# Patient Record
Sex: FEMALE | Race: BLACK OR AFRICAN AMERICAN
[De-identification: names, ages, dates, MRNs, and addresses within clinical notes are randomized per-mention and may not be internally consistent; named-entity substitution may affect disease eponyms.]

---

## 2017-02-06 ENCOUNTER — HOSPITAL ENCOUNTER (OUTPATIENT)
Dept: HOSPITAL 62 - OD | Age: 32
End: 2017-02-06
Attending: NURSE PRACTITIONER
Payer: COMMERCIAL

## 2017-02-06 DIAGNOSIS — O10.011: Primary | ICD-10-CM

## 2017-02-06 LAB
ALT SERPL-CCNC: 26 U/L (ref 9–52)
ANION GAP SERPL CALC-SCNC: 10 MMOL/L (ref 5–19)
APTT BLD: 28.5 SEC (ref 23.5–35.8)
AST SERPL-CCNC: 18 U/L (ref 14–36)
BASOPHILS # BLD AUTO: 0 10^3/UL (ref 0–0.2)
BASOPHILS NFR BLD AUTO: 0.4 % (ref 0–2)
BUN SERPL-MCNC: 5 MG/DL (ref 7–20)
CALCIUM: 9 MG/DL (ref 8.4–10.2)
CHLORIDE SERPL-SCNC: 101 MMOL/L (ref 98–107)
CO2 SERPL-SCNC: 25 MMOL/L (ref 22–30)
CREAT SERPL-MCNC: 0.64 MG/DL (ref 0.52–1.25)
EOSINOPHIL # BLD AUTO: 0 10^3/UL (ref 0–0.6)
EOSINOPHIL NFR BLD AUTO: 0.4 % (ref 0–6)
ERYTHROCYTE [DISTWIDTH] IN BLOOD BY AUTOMATED COUNT: 14.2 % (ref 11.5–14)
GLUCOSE SERPL-MCNC: 86 MG/DL (ref 75–110)
HCT VFR BLD CALC: 37.6 % (ref 36–47)
HGB BLD-MCNC: 12.3 G/DL (ref 12–15.5)
HGB HCT DIFFERENCE: -0.7
LDH1 SERPL-CCNC: 386 U/L (ref 313–618)
LYMPHOCYTES # BLD AUTO: 2.7 10^3/UL (ref 0.5–4.7)
LYMPHOCYTES NFR BLD AUTO: 26.4 % (ref 13–45)
MCH RBC QN AUTO: 26.8 PG (ref 27–33.4)
MCHC RBC AUTO-ENTMCNC: 32.6 G/DL (ref 32–36)
MCV RBC AUTO: 82 FL (ref 80–97)
MONOCYTES # BLD AUTO: 0.5 10^3/UL (ref 0.1–1.4)
MONOCYTES NFR BLD AUTO: 5 % (ref 3–13)
NEUTROPHILS # BLD AUTO: 6.8 10^3/UL (ref 1.7–8.2)
NEUTS SEG NFR BLD AUTO: 67.8 % (ref 42–78)
POTASSIUM SERPL-SCNC: 4.1 MMOL/L (ref 3.6–5)
PROTHROMBIN TIME: 14.1 SEC (ref 11.4–15.4)
RBC # BLD AUTO: 4.57 10^6/UL (ref 3.72–5.28)
SODIUM SERPL-SCNC: 135.8 MMOL/L (ref 137–145)
URATE SERPL-MCNC: 5.1 MG/DL (ref 2.5–6.2)
WBC # BLD AUTO: 10.1 10^3/UL (ref 4–10.5)

## 2017-02-06 PROCEDURE — 85610 PROTHROMBIN TIME: CPT

## 2017-02-06 PROCEDURE — 85730 THROMBOPLASTIN TIME PARTIAL: CPT

## 2017-02-06 PROCEDURE — 36415 COLL VENOUS BLD VENIPUNCTURE: CPT

## 2017-02-06 PROCEDURE — 84156 ASSAY OF PROTEIN URINE: CPT

## 2017-02-06 PROCEDURE — 82570 ASSAY OF URINE CREATININE: CPT

## 2017-02-06 PROCEDURE — 84460 ALANINE AMINO (ALT) (SGPT): CPT

## 2017-02-06 PROCEDURE — 83615 LACTATE (LD) (LDH) ENZYME: CPT

## 2017-02-06 PROCEDURE — 80048 BASIC METABOLIC PNL TOTAL CA: CPT

## 2017-02-06 PROCEDURE — 84450 TRANSFERASE (AST) (SGOT): CPT

## 2017-02-06 PROCEDURE — 85025 COMPLETE CBC W/AUTO DIFF WBC: CPT

## 2017-02-06 PROCEDURE — 84550 ASSAY OF BLOOD/URIC ACID: CPT

## 2017-02-08 LAB — CREAT UR-MCNC: 118.8 MG/DL (ref 16–327)

## 2017-02-09 ENCOUNTER — HOSPITAL ENCOUNTER (EMERGENCY)
Dept: HOSPITAL 62 - ER | Age: 32
Discharge: HOME | End: 2017-02-09
Payer: COMMERCIAL

## 2017-02-09 VITALS — SYSTOLIC BLOOD PRESSURE: 160 MMHG | DIASTOLIC BLOOD PRESSURE: 60 MMHG

## 2017-02-09 DIAGNOSIS — Z3A.09: ICD-10-CM

## 2017-02-09 DIAGNOSIS — O21.9: Primary | ICD-10-CM

## 2017-02-09 DIAGNOSIS — O10.911: ICD-10-CM

## 2017-02-09 LAB
APPEARANCE UR: (no result)
BILIRUB UR QL STRIP: NEGATIVE
GLUCOSE UR STRIP-MCNC: NEGATIVE MG/DL
KETONES UR STRIP-MCNC: (no result) MG/DL
NITRITE UR QL STRIP: NEGATIVE
PH UR STRIP: 6 [PH] (ref 5–9)
PROT 24H UR-MRATE: 236.7 MG/24 HR (ref 30–150)
PROT UR STRIP-MCNC: 100 MG/DL
SP GR UR STRIP: 1.03
UROBILINOGEN UR-MCNC: NEGATIVE MG/DL (ref ?–2)

## 2017-02-09 PROCEDURE — 81001 URINALYSIS AUTO W/SCOPE: CPT

## 2017-02-09 PROCEDURE — 99283 EMERGENCY DEPT VISIT LOW MDM: CPT

## 2017-02-09 NOTE — ER DOCUMENT REPORT
ED General





- General


Chief Complaint: Nausea/Vomiting


Stated Complaint: VOMITING/PREGNANT


Mode of Arrival: Ambulatory


Information source: Patient


Notes: 


31-year-old female  4 para 3 who is 9 weeks pregnant presents with 

complaints of nausea vomiting.  Patient notes she took Zofran at home and 

continues to vomit for the past 2 days.  Denies any blood in her vomit, denies 

any diarrhea.


Patient has had multiple similar episodes in the past with her previous 

pregnancies


TRAVEL OUTSIDE OF THE [place] IN LAST 30 DAYS: No





- HPI


Onset: Yesterday


Onset/Duration: Persistent


Quality of pain: No pain


Severity: Mild


Pain Level: Denies


Associated symptoms: Nausea, Vomiting


Exacerbated by: Denies


Relieved by: Denies


Similar symptoms previously: Yes


Recently seen / treated by doctor: No





- Related Data


Allergies/Adverse Reactions: 


 





hydrocodone bitartrate [From Vicodin] Allergy (Mild, Verified 17 04:43)


 VOMITING











Past Medical History





- Social History


Smoking Status: Never Smoker


Cigarette use (# per day): No


Chew tobacco use (# tins/day): No


Smoking Education Provided: No


Frequency of alcohol use: None


Drug Abuse: None


Family History: DM, Hypertension


Patient has suicidal ideation: No


Patient has homicidal ideation: No





- Past Medical History


Cardiac Medical History: Reports: Hx Hypertension


   Denies: Hx Coronary Artery Disease, Hx Heart Attack


Pulmonary Medical History: 


   Denies: Hx Asthma, Hx Bronchitis, Hx COPD, Hx Pneumonia


Neurological Medical History: Denies: Hx Cerebrovascular Accident, Hx Seizures


Renal/ Medical History: Denies: Hx Peritoneal Dialysis


Musculoskeltal Medical History: Denies Hx Arthritis


Past Surgical History: Reports: Hx  Section, Hx Dilation and Curettage.

  Denies: Hx Hysterectomy





- Immunizations


Hx Diphtheria, Pertussis, Tetanus Vaccination: Yes





Review of Systems





- Review of Systems


Notes: 


REVIEW OF SYSTEMS:


CONSTITUTIONAL :  Denies fever,  chills, or sweats.  Denies recent illness.


EENT:   Denies eye, ear, throat, or mouth pain or symptoms.  Denies nasal or 

sinus congestion or discharge.  Denies throat, tongue, or mouth swelling or 

difficulty swallowing.


CARDIOVASCULAR:  Denies chest pain.  Denies palpitations or racing or irregular 

heart beat.  Denies ankle edema.


RESPIRATORY:  Denies cough, cold, or chest congestion.  Denies shortness of 

breath, difficulty breathing, or wheezing.


GASTROINTESTINAL: Admits nausea vomiting


GENITOURINARY:  Denies difficulty urinating, painful urination, burning, 

frequency, blood in urine, or discharge.


FEMALE  GENITOURINARY:  Denies vaginal bleeding, heavy or abnormal periods, 

irregular periods.  Denies vaginal discharge or odor. 


MUSCULOSKELETAL:  Denies back or neck pain or stiffness.  Denies joint pain or 

swelling.


SKIN:   Denies rash, lesions or sores.


HEMATOLOGIC :   Denies easy bruising or bleeding.


LYMPHATIC:  Denies swollen, enlarged glands.


NEUROLOGICAL:  Denies confusion or altered mental status.  Denies passing out 

or loss of consciousness.  Denies dizziness or lightheadedness.  Denies 

headache.  Denies weakness or paralysis or loss of use of either side.  Denies 

problems with gait or speech.  Denies sensory loss, numbness, or tingling.  

Denies seizures.


PSYCHIATRIC:  Denies anxiety or stress.  Denies depression, suicidal ideation, 

or homicidal ideation.





ALL OTHER SYSTEMS REVIEWED AND NEGATIVE.








Dictation was performed using Dragon voice recognition software 








PHYSICAL EXAMINATION:





GENERAL: Well-appearing, well-nourished and in no acute distress.





HEAD: Atraumatic, normocephalic.





EYES: Pupils equal round and reactive to light, extraocular movements intact, 

conjunctiva are normal.





ENT: Nares patent, oropharynx clear without exudates.  Moist mucous membranes.





NECK: Normal range of motion, supple without lymphadenopathy





LUNGS: Breath sounds clear to auscultation bilaterally and equal.  No wheezes 

rales or rhonchi.





HEART: Regular rate and rhythm without murmurs





ABDOMEN: Soft, nontender, nondistended abdomen.  No guarding, no rebound.  No 

masses appreciated.





Female : deferred





Musculoskeletal: Normal range of motion, no pitting or edema.  No cyanosis.





NEUROLOGICAL: Cranial nerves grossly intact.  Normal speech, normal gait.  

Normal sensory, motor exams





PSYCH: Normal mood, normal affect.





SKIN: Warm, Dry, normal turgor, no rashes or lesions noted.








Physical Exam





- Vital signs


Vitals: 


 











Temp Pulse Resp BP Pulse Ox


 


 98.6 F   79   18   163/62 H  100 


 


 17 04:39  17 04:39  17 04:39  17 04:39  17 04:39














Course





- Re-evaluation


Re-evalutation: 


17 06:44


Physical examination noted no signs of dehydration, patient does have some 

protein in her urine with trace ketones.  Otherwise she looks quite well.  

Patient is given Phenergan and will be reevaluated with oral hydration





17 07:17


Patient notes she has not vomited since receiving Phenergan admits significant 

relief in symptoms.  I will send her home with oral and suppository








Patient has follow-up with OB/GYN next week denies any vaginal bleeding or 

discharge or abdominal pain








After performing a Medical Screening Examination, I estimate there is LOW risk 

for ACUTE APPENDICITIS, BOWEL OBSTRUCTION, ACUTE CHOLECYSTITIS, PERFORATED 

DIVERTICULITIS, INCARCERATED HERNIA, PANCREATITIS, PELVIC INFLAMMATORY DISEASE, 

PERFORATED ULCER, ECTOPIC PREGNANCY, or TUBO-OVARIAN ABSCESS, thus I consider 

the discharge disposition reasonable. Also, there is no evidence or peritonitis

, sepsis, or toxicity. The patient and I have discussed the diagnosis and risks

, and we agree with discharging home with close follow-up with the 

understanding that symptoms and presentations can change. We also discussed 

returning to the Emergency Department immediately if new or worsening symptoms 

occur. We have discussed the symptoms which are most concerning (e.g., bloody 

stool, fever, changing or worsening pain, vomiting) that necessitate immediate 

return.





- Vital Signs


Vital signs: 


 











Temp Pulse Resp BP Pulse Ox


 


 98.6 F   79   18   163/62 H  100 


 


 17 04:41  17 04:41  17 04:41  17 04:41  17 04:41














- Laboratory


Laboratory results interpreted by me: 


 











  17





  05:17


 


Urine Protein  100 H


 


Urine Ketones  TRACE H














Discharge





- Discharge


Clinical Impression: 


 Vomiting affecting pregnancy





HTN (hypertension)


Qualifiers:


 Hypertension type: essential hypertension Qualified Code(s): I10 - Essential (

primary) hypertension





Condition: Stable


Disposition: HOME, SELF-CARE


Instructions:  Antinausea Medication ([facility])


Prescriptions: 


Promethazine HCl [Phenergan 25 mg Tablet] 1 - 2 tab PO Q6H PRN #30 tablet


 PRN Reason: 


Promethazine HCl 25 mg RC Q8 #20 supp.rect


Forms:  Elevated Blood Pressure


Referrals: 


JORDAN LAND MD [Primary Care Provider] - Follow up tomorrow

## 2017-07-28 ENCOUNTER — HOSPITAL ENCOUNTER (OUTPATIENT)
Dept: HOSPITAL 62 - LC | Age: 32
Discharge: HOME | End: 2017-07-28
Attending: OBSTETRICS & GYNECOLOGY
Payer: COMMERCIAL

## 2017-07-28 DIAGNOSIS — Z3A.34: ICD-10-CM

## 2017-07-28 DIAGNOSIS — O13.3: Primary | ICD-10-CM

## 2017-07-28 LAB
ALBUMIN SERPL-MCNC: 3.5 G/DL (ref 3.5–5)
ALP SERPL-CCNC: 104 U/L (ref 38–126)
ALT SERPL-CCNC: 19 U/L (ref 9–52)
ANION GAP SERPL CALC-SCNC: 12 MMOL/L (ref 5–19)
APPEARANCE UR: (no result)
AST SERPL-CCNC: 16 U/L (ref 14–36)
BARBITURATES UR QL SCN: NEGATIVE
BASOPHILS # BLD AUTO: 0 10^3/UL (ref 0–0.2)
BASOPHILS NFR BLD AUTO: 0.2 % (ref 0–2)
BILIRUB DIRECT SERPL-MCNC: 0.2 MG/DL (ref 0–0.4)
BILIRUB SERPL-MCNC: 0.3 MG/DL (ref 0.2–1.3)
BILIRUB UR QL STRIP: NEGATIVE
BUN SERPL-MCNC: 4 MG/DL (ref 7–20)
CALCIUM: 8.9 MG/DL (ref 8.4–10.2)
CHLORIDE SERPL-SCNC: 105 MMOL/L (ref 98–107)
CO2 SERPL-SCNC: 18 MMOL/L (ref 22–30)
CREAT SERPL-MCNC: 0.57 MG/DL (ref 0.52–1.25)
CREAT UR-MCNC: 238.3 MG/DL (ref 16–327)
EOSINOPHIL # BLD AUTO: 0 10^3/UL (ref 0–0.6)
EOSINOPHIL NFR BLD AUTO: 0.2 % (ref 0–6)
ERYTHROCYTE [DISTWIDTH] IN BLOOD BY AUTOMATED COUNT: 14.2 % (ref 11.5–14)
GLUCOSE SERPL-MCNC: 137 MG/DL (ref 75–110)
GLUCOSE UR STRIP-MCNC: NEGATIVE MG/DL
HCT VFR BLD CALC: 33.2 % (ref 36–47)
HGB BLD-MCNC: 10.9 G/DL (ref 12–15.5)
HGB HCT DIFFERENCE: -0.5
KETONES UR STRIP-MCNC: NEGATIVE MG/DL
LDH1 SERPL-CCNC: 309 U/L (ref 313–618)
LYMPHOCYTES # BLD AUTO: 1.9 10^3/UL (ref 0.5–4.7)
LYMPHOCYTES NFR BLD AUTO: 23.4 % (ref 13–45)
MCH RBC QN AUTO: 26.2 PG (ref 27–33.4)
MCHC RBC AUTO-ENTMCNC: 32.9 G/DL (ref 32–36)
MCV RBC AUTO: 80 FL (ref 80–97)
METHADONE UR QL SCN: NEGATIVE
MONOCYTES # BLD AUTO: 0.3 10^3/UL (ref 0.1–1.4)
MONOCYTES NFR BLD AUTO: 4 % (ref 3–13)
NEUTROPHILS # BLD AUTO: 5.8 10^3/UL (ref 1.7–8.2)
NEUTS SEG NFR BLD AUTO: 72.2 % (ref 42–78)
NITRITE UR QL STRIP: NEGATIVE
PCP UR QL SCN: NEGATIVE
PH UR STRIP: 6 [PH] (ref 5–9)
POTASSIUM SERPL-SCNC: 3.8 MMOL/L (ref 3.6–5)
PROT SERPL-MCNC: 7.1 G/DL (ref 6.3–8.2)
PROT UR STRIP-MCNC: 30 MG/DL
PROT UR STRIP-MCNC: 6.2 MG/DL (ref ?–12)
RBC # BLD AUTO: 4.17 10^6/UL (ref 3.72–5.28)
SODIUM SERPL-SCNC: 135.2 MMOL/L (ref 137–145)
SP GR UR STRIP: 1.03
URATE SERPL-MCNC: 5.2 MG/DL (ref 2.5–6.2)
URINE OPIATES LOW: NEGATIVE
UROBILINOGEN UR-MCNC: 2 MG/DL (ref ?–2)
WBC # BLD AUTO: 8 10^3/UL (ref 4–10.5)

## 2017-07-28 PROCEDURE — 82570 ASSAY OF URINE CREATININE: CPT

## 2017-07-28 PROCEDURE — 83615 LACTATE (LD) (LDH) ENZYME: CPT

## 2017-07-28 PROCEDURE — 80307 DRUG TEST PRSMV CHEM ANLYZR: CPT

## 2017-07-28 PROCEDURE — 59025 FETAL NON-STRESS TEST: CPT

## 2017-07-28 PROCEDURE — 85025 COMPLETE CBC W/AUTO DIFF WBC: CPT

## 2017-07-28 PROCEDURE — 84550 ASSAY OF BLOOD/URIC ACID: CPT

## 2017-07-28 PROCEDURE — 4A1HXCZ MONITORING OF PRODUCTS OF CONCEPTION, CARDIAC RATE, EXTERNAL APPROACH: ICD-10-PCS | Performed by: OBSTETRICS & GYNECOLOGY

## 2017-07-28 PROCEDURE — 36415 COLL VENOUS BLD VENIPUNCTURE: CPT

## 2017-07-28 PROCEDURE — 84156 ASSAY OF PROTEIN URINE: CPT

## 2017-07-28 PROCEDURE — 80053 COMPREHEN METABOLIC PANEL: CPT

## 2017-07-28 PROCEDURE — 81001 URINALYSIS AUTO W/SCOPE: CPT

## 2017-07-28 NOTE — NON STRESS TEST REPORT
=================================================================

Non Stress Test

=================================================================

Datetime Report Generated by CPN: 07/28/2017 18:45

   

   

=================================================================

DEMOGRAPHIC

=================================================================

   

EGA NST:  34.0

   

=================================================================

INDICATION

=================================================================

   

Indication for Study:  Ordered by Provider

   

=================================================================

MONITORING

=================================================================

   

Monitor Explained:  Monitor Explained; Test Explained; Patient

   Verbalized Understanding

Time on Monitor:  07/28/2017 18:00

Time off Monitor:  07/28/2017 18:20

NST Duration:  20

   

=================================================================

NST INTERVENTIONS

=================================================================

   

NST Interventions:  PO Hydration; Reposition Patient

Physician Notified NST:  Dr Neilsen

BABY A:  [id number]

   

=================================================================

BABY A

=================================================================

   

Fetal Movement :  Present

Contraction Frequency :  none

FHR Baseline :  140

Accelerations :  15X15

Decelerations :  None

Variability :  Moderate 6-25bpm

NST Review:  Meets Criteria for Reactive NST

NST Review and Verified By :  OTONIEL Jackson RNC

NST Results:  Reactive

   

=================================================================

NST REPORT

=================================================================

   

Report Trigger:  Send Report

## 2017-08-07 ENCOUNTER — HOSPITAL ENCOUNTER (EMERGENCY)
Dept: HOSPITAL 62 - ER | Age: 32
Discharge: HOME | End: 2017-08-07
Payer: COMMERCIAL

## 2017-08-07 VITALS — DIASTOLIC BLOOD PRESSURE: 81 MMHG | SYSTOLIC BLOOD PRESSURE: 133 MMHG

## 2017-08-07 DIAGNOSIS — M54.5: Primary | ICD-10-CM

## 2017-08-07 DIAGNOSIS — M79.604: ICD-10-CM
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
## 2017-08-07 DIAGNOSIS — M25.551: ICD-10-CM

## 2017-08-07 DIAGNOSIS — Z3A.36: ICD-10-CM

## 2017-08-07 PROCEDURE — 99283 EMERGENCY DEPT VISIT LOW MDM: CPT

## 2017-08-07 NOTE — ER DOCUMENT REPORT
ED General





- General


Chief Complaint: Fall


Stated Complaint: FALL/LEG PAIN


Time Seen by Provider: 17 01:45


Notes: 


Patient is a 32-year-old female currently 36 weeks pregnant who presents after 

having a mechanical trip and fall onto her right hip several hours prior to 

arrival.  Since that time she notes that she has had a moderate to severe 

bilateral low back pain.  The pain is described as a dull, constant, throbbing 

pain.  She has not tried anything to improve the pain.  Moving worsens the 

pain.  She denies any abdominal trauma.  No vaginal bleeding.  Active fetal 

movements continues to be felt.  She has not seen a primary doctor regarding 

today's concerns.  Denies any history of similar symptoms in the past.  Denies 

hitting her head or neck.  She denies any difficulty with ambulation.  No right 

hip pain which is the location of impact for her fall.


TRAVEL OUTSIDE OF THE [place] IN LAST 30 DAYS: No





- Related Data


Allergies/Adverse Reactions: 


 





hydrocodone bitartrate [From Vicodin] Adverse Reaction (Mild, Verified 17 

16:34)


 VOMITING











Past Medical History





- General


Information source: Patient





- Social History


Smoking Status: Never Smoker


Frequency of alcohol use: None


Drug Abuse: None


Lives with: Family


Family History: DM, Hypertension





- Past Medical History


Cardiac Medical History: Reports: Hx Hypertension


   Denies: Hx Coronary Artery Disease, Hx Heart Attack


Pulmonary Medical History: 


   Denies: Hx Asthma, Hx Bronchitis, Hx COPD, Hx Pneumonia


Neurological Medical History: Denies: Hx Cerebrovascular Accident, Hx Seizures


Renal/ Medical History: Denies: Hx Peritoneal Dialysis


Musculoskeltal Medical History: Denies Hx Arthritis


Past Surgical History: Reports: Hx  Section, Hx Dilation and Curettage.

  Denies: Hx Hysterectomy





- Immunizations


Hx Diphtheria, Pertussis, Tetanus Vaccination: Yes





Review of Systems





- Review of Systems


Notes: 


Constitutional: Negative for fever.


HENT: Negative for sore throat.


Eyes: Negative for visual changes.


Cardiovascular: Negative for chest pain.


Respiratory: Negative for shortness of breath.


Gastrointestinal: Negative for abdominal pain, vomiting or diarrhea.


Genitourinary: Negative for dysuria.


Musculoskeletal: Positive for back pain.


Skin: Negative for rash.


Neurological: Negative for headaches, weakness or numbness.





10 point ROS negative except as marked above and in HPI.





Physical Exam





- Vital signs


Vitals: 


 











Temp Pulse Resp BP Pulse Ox


 


 98.0 F   78   18   133/64 H  99 


 


 17 00:37  17 00:37  17 00:37  17 00:37  17 00:37











Interpretation: Hypertensive


Notes: 


PHYSICAL EXAMINATION:





GENERAL: Well-appearing, well-nourished and in no acute distress.





HEAD: Atraumatic, normocephalic.





EYES: Pupils equal round and reactive to light, extraocular movements intact, 

sclera anicteric, conjunctiva are normal.





ENT: nares patent, oropharynx clear without exudates.  Moist mucous membranes.





NECK: Normal range of motion, supple without lymphadenopathy





LUNGS: Breath sounds clear to auscultation bilaterally and equal.  No wheezes 

rales or rhonchi.





HEART: Regular rate and rhythm without murmurs





ABDOMEN: Gravid uterus, soft, nontender, normoactive bowel sounds.  No guarding

, no rebound.  No masses appreciated.





EXTREMITIES: Normal range of motion, no pitting or edema.  No cyanosis.





Back: No midline spinal tenderness, step-offs or deformities.  Pain on 

palpation of the bilateral blair-Lumbar spinal region





NEUROLOGICAL: 5 out of 5 strength both distally and proximally bilateral lower 

extremities.  2+ patellar reflexes bilaterally.  No clonus.  Sensation grossly 

intact in the bilateral lower extremities.  Patient is able to ambulate without 

difficulty.





PSYCH: Normal mood, normal affect.





SKIN: Warm, Dry, normal turgor, no rashes or lesions noted.





Course





- Re-evaluation


Re-evalutation: 





17 01:55


Patient presents after having a mechanical fall onto her right hip several 

hours prior to arrival.  She is 36 weeks pregnant.  Bedside ultrasound shows 

appropriate fetal movement, cardiac activity appropriate 146 bpm.  Patient has 

no difficulty with ambulation and is actually not complaining of any 

significant right hip pain.  She is rather complaining of diffuse low back 

pain. No rapid progression of symptoms, systemic symptoms including fevers, 

chills, weight loss, history of recent bacterial infection, bilateral symptoms, 

numbness, weakness, difficulty walking, urinary retention or bowel incontinence

, personal history of cancer, immunosuppression, diabetes, known AAA, or 

history of IV drug use.  Exam is without point tenderness over vertebral bodies 

and patient has symmetric and intact lower extremity strength, sensation, and 

reflexes without clonus. 2+ symmetric medial malleolar and dorsalis pedis pulses








Based on history and physical, I have a very low suspicion of a concerning 

etiology of pain including epidural compression syndrome, spinal infection, 

transverse myelitis, malignancy, abdominal aortic aneurysm, renal colic, acute 

lower extremity claudication, neurogenic claudication, ankylosing spondylitis, 

or other intra-abdominal process. Due to absence of concerning risk factors in 

history and physical as well as absence of rapidly progressive, severe, or 

bilateral symptoms, will defer imaging at this point.  Suspect likely 

musculoskeletal injury in the setting of a mechanical fall.  At this time will 

discharge with return precautions and follow-up recommendations.  Verbal 

discharge instructions given a the bedside and opportunity for questions given. 

Medication warnings reviewed. Patient is in agreement with this plan and has 

verbalized understanding of return precautions and the need for primary care 

follow-up in the next 24-72 hours.





- Vital Signs


Vital signs: 


 











Temp Pulse Resp BP Pulse Ox


 


 98.0 F   78   18   133/64 H  99 


 


 17 00:37  17 00:37  17 00:37  17 00:37  17 00:37














Discharge





- Discharge


Clinical Impression: 


Fall


Qualifiers:


 Encounter type: initial encounter Qualified Code(s): W19.XXXA - Unspecified 

fall, initial encounter





Low back pain


Qualifiers:


 Chronicity: acute Back pain laterality: bilateral Sciatica presence: without 

sciatica Qualified Code(s): M54.5 - Low back pain





Condition: Good


Disposition: HOME, SELF-CARE


Additional Instructions: 


You have been seen in the Emergency Department (ED)  today for back pain.  Your 

workup and exam have not shown any acute abnormalities and you are likely 

suffering from muscle strain or possible problems with your discs, but there is 

no treatment that will fix your symptoms at this time.  Please take Tylenol 

1000 mg every 6 hours as needed for pain.  You should also purchase a local 

lidocaine cream such as "aspercreme with lidocaine" and use per bottle 

instructions to the affected area. Apply heat to the area as often as you are 

able. Continue to keep active and avoid prolonged periods of bed rest.





Please follow up with your doctor as soon as possible regarding today's ED 

visit and your back pain.  Return to the ED for worsening back pain, fever, 

weakness or numbness of either leg, or if you develop either (1) an inability 

to urinate or have bowel movements, or (2) loss of your ability to control your 

bathroom functions (if you start having "accidents"), or if you develop other 

new symptoms that concern you.concern you.


Referrals: 


NIVIA QUIROZ MD [Primary Care Provider] - Follow up as needed

## 2017-08-21 ENCOUNTER — HOSPITAL ENCOUNTER (OUTPATIENT)
Dept: HOSPITAL 62 - LC | Age: 32
Discharge: HOME | End: 2017-08-21
Attending: OBSTETRICS & GYNECOLOGY
Payer: COMMERCIAL

## 2017-08-21 DIAGNOSIS — O46.93: ICD-10-CM

## 2017-08-21 DIAGNOSIS — Z3A.37: ICD-10-CM

## 2017-08-21 DIAGNOSIS — O36.8130: Primary | ICD-10-CM

## 2017-08-21 LAB
APPEARANCE UR: (no result)
BARBITURATES UR QL SCN: NEGATIVE
BILIRUB UR QL STRIP: NEGATIVE
GLUCOSE UR STRIP-MCNC: NEGATIVE MG/DL
KETONES UR STRIP-MCNC: NEGATIVE MG/DL
METHADONE UR QL SCN: NEGATIVE
NITRITE UR QL STRIP: NEGATIVE
PCP UR QL SCN: NEGATIVE
PH UR STRIP: 6 [PH] (ref 5–9)
PROT UR STRIP-MCNC: 30 MG/DL
SP GR UR STRIP: 1.02
URINE OPIATES LOW: NEGATIVE
UROBILINOGEN UR-MCNC: NEGATIVE MG/DL (ref ?–2)

## 2017-08-21 PROCEDURE — 81005 URINALYSIS: CPT

## 2017-08-21 PROCEDURE — 59025 FETAL NON-STRESS TEST: CPT

## 2017-08-21 PROCEDURE — 82962 GLUCOSE BLOOD TEST: CPT

## 2017-08-21 PROCEDURE — 4A1HXCZ MONITORING OF PRODUCTS OF CONCEPTION, CARDIAC RATE, EXTERNAL APPROACH: ICD-10-PCS | Performed by: OBSTETRICS & GYNECOLOGY

## 2017-08-21 PROCEDURE — 80307 DRUG TEST PRSMV CHEM ANLYZR: CPT

## 2017-08-31 LAB
APPEARANCE UR: (no result)
BARBITURATES UR QL SCN: NEGATIVE
BASOPHILS # BLD AUTO: 0 10^3/UL (ref 0–0.2)
BASOPHILS NFR BLD AUTO: 0.1 % (ref 0–2)
BILIRUB UR QL STRIP: NEGATIVE
EOSINOPHIL # BLD AUTO: 0 10^3/UL (ref 0–0.6)
EOSINOPHIL NFR BLD AUTO: 0.3 % (ref 0–6)
ERYTHROCYTE [DISTWIDTH] IN BLOOD BY AUTOMATED COUNT: 14.7 % (ref 11.5–14)
GLUCOSE UR STRIP-MCNC: >=500 MG/DL
HCT VFR BLD CALC: 34.9 % (ref 36–47)
HGB BLD-MCNC: 11.6 G/DL (ref 12–15.5)
HGB HCT DIFFERENCE: -0.1
KETONES UR STRIP-MCNC: NEGATIVE MG/DL
LYMPHOCYTES # BLD AUTO: 1.5 10^3/UL (ref 0.5–4.7)
LYMPHOCYTES NFR BLD AUTO: 19.3 % (ref 13–45)
MCH RBC QN AUTO: 26.5 PG (ref 27–33.4)
MCHC RBC AUTO-ENTMCNC: 33.2 G/DL (ref 32–36)
MCV RBC AUTO: 80 FL (ref 80–97)
METHADONE UR QL SCN: NEGATIVE
MONOCYTES # BLD AUTO: 0.4 10^3/UL (ref 0.1–1.4)
MONOCYTES NFR BLD AUTO: 4.8 % (ref 3–13)
NEUTROPHILS # BLD AUTO: 5.8 10^3/UL (ref 1.7–8.2)
NEUTS SEG NFR BLD AUTO: 75.5 % (ref 42–78)
NITRITE UR QL STRIP: NEGATIVE
PCP UR QL SCN: NEGATIVE
PH UR STRIP: 7 [PH] (ref 5–9)
PROT UR STRIP-MCNC: 30 MG/DL
RBC # BLD AUTO: 4.37 10^6/UL (ref 3.72–5.28)
SP GR UR STRIP: 1.02
URINE OPIATES LOW: NEGATIVE
UROBILINOGEN UR-MCNC: NEGATIVE MG/DL (ref ?–2)
WBC # BLD AUTO: 7.7 10^3/UL (ref 4–10.5)

## 2017-09-01 ENCOUNTER — HOSPITAL ENCOUNTER (INPATIENT)
Dept: HOSPITAL 62 - 2N | Age: 32
LOS: 2 days | Discharge: HOME | End: 2017-09-03
Attending: OBSTETRICS & GYNECOLOGY | Admitting: OBSTETRICS & GYNECOLOGY
Payer: COMMERCIAL

## 2017-09-01 DIAGNOSIS — Z79.84: ICD-10-CM

## 2017-09-01 DIAGNOSIS — O34.211: Primary | ICD-10-CM

## 2017-09-01 DIAGNOSIS — E66.8: ICD-10-CM

## 2017-09-01 DIAGNOSIS — E11.69: ICD-10-CM

## 2017-09-01 DIAGNOSIS — Z3A.39: ICD-10-CM

## 2017-09-01 PROCEDURE — 94799 UNLISTED PULMONARY SVC/PX: CPT

## 2017-09-01 PROCEDURE — 59025 FETAL NON-STRESS TEST: CPT

## 2017-09-01 PROCEDURE — 82962 GLUCOSE BLOOD TEST: CPT

## 2017-09-01 PROCEDURE — 86850 RBC ANTIBODY SCREEN: CPT

## 2017-09-01 PROCEDURE — 80307 DRUG TEST PRSMV CHEM ANLYZR: CPT

## 2017-09-01 PROCEDURE — 86901 BLOOD TYPING SEROLOGIC RH(D): CPT

## 2017-09-01 PROCEDURE — 81001 URINALYSIS AUTO W/SCOPE: CPT

## 2017-09-01 PROCEDURE — 86900 BLOOD TYPING SEROLOGIC ABO: CPT

## 2017-09-01 PROCEDURE — 36415 COLL VENOUS BLD VENIPUNCTURE: CPT

## 2017-09-01 PROCEDURE — 85025 COMPLETE CBC W/AUTO DIFF WBC: CPT

## 2017-09-01 PROCEDURE — 85027 COMPLETE CBC AUTOMATED: CPT

## 2017-09-01 RX ADMIN — OXYCODONE AND ACETAMINOPHEN PRN TAB: 5; 325 TABLET ORAL at 21:28

## 2017-09-01 RX ADMIN — DOCUSATE SODIUM SCH MG: 100 CAPSULE, LIQUID FILLED ORAL at 17:47

## 2017-09-01 RX ADMIN — IBUPROFEN SCH MG: 800 TABLET, FILM COATED ORAL at 17:46

## 2017-09-01 NOTE — NON STRESS TEST REPORT
=================================================================

Non Stress Test

=================================================================

Datetime Report Generated by CPN: 09/01/2017 08:51

   

   

=================================================================

DEMOGRAPHIC

=================================================================

   

EGA NST:  37.3

   

=================================================================

INDICATION

=================================================================

   

Indication for Study:  Decreased Fetal Movement; Ordered by Provider

   

=================================================================

MONITORING

=================================================================

   

Monitor Explained:  Monitor Explained; Test Explained; Patient

   Verbalized Understanding

Time on Monitor:  08/21/2017 13:13

Time off Monitor:  08/21/2017 13:49

NST Duration:  36

   

=================================================================

NST INTERVENTIONS

=================================================================

   

NST Interventions:  None

Physician Notified NST:  K Correia CNM

BABY A:  [id number]

   

=================================================================

BABY A

=================================================================

   

Fetal Movement :  Decreased

Contraction Frequency :  0

FHR Baseline :  150

Accelerations :  15X15

Decelerations :  None

Variability :  Moderate 6-25bpm

NST Review:  Meets Criteria for Reactive NST

NST Review and Verified By :  WILLIAMS GUTIERREZ RN

NST Results:  Reactive

   

=================================================================

NST COMMENTS

=================================================================

   

NST Comments:  PT had BPP at [facility] today 8/8

   

=================================================================

NST REPORT

=================================================================

   

Report Trigger:  Send Report

## 2017-09-02 LAB
ERYTHROCYTE [DISTWIDTH] IN BLOOD BY AUTOMATED COUNT: 14.7 % (ref 11.5–14)
HCT VFR BLD CALC: 30.1 % (ref 36–47)
HGB BLD-MCNC: 9.9 G/DL (ref 12–15.5)
HGB HCT DIFFERENCE: -0.4
MCH RBC QN AUTO: 26.6 PG (ref 27–33.4)
MCHC RBC AUTO-ENTMCNC: 32.7 G/DL (ref 32–36)
MCV RBC AUTO: 82 FL (ref 80–97)
RBC # BLD AUTO: 3.7 10^6/UL (ref 3.72–5.28)
WBC # BLD AUTO: 8.9 10^3/UL (ref 4–10.5)

## 2017-09-02 RX ADMIN — IBUPROFEN SCH MG: 800 TABLET, FILM COATED ORAL at 00:44

## 2017-09-02 RX ADMIN — IBUPROFEN SCH MG: 800 TABLET, FILM COATED ORAL at 17:08

## 2017-09-02 RX ADMIN — IBUPROFEN SCH MG: 800 TABLET, FILM COATED ORAL at 06:42

## 2017-09-02 RX ADMIN — OXYCODONE AND ACETAMINOPHEN PRN TAB: 5; 325 TABLET ORAL at 23:40

## 2017-09-02 RX ADMIN — OXYCODONE AND ACETAMINOPHEN PRN TAB: 5; 325 TABLET ORAL at 04:44

## 2017-09-02 RX ADMIN — DOCUSATE SODIUM SCH MG: 100 CAPSULE, LIQUID FILLED ORAL at 17:08

## 2017-09-02 RX ADMIN — OXYCODONE AND ACETAMINOPHEN PRN TAB: 5; 325 TABLET ORAL at 17:06

## 2017-09-02 RX ADMIN — PRENATAL W/O A VIT W/ FE FUMARATE-FA CAP 106.5-1 MG SCH CAP: 106.5 CAPSULE ORAL at 10:06

## 2017-09-02 RX ADMIN — OXYCODONE AND ACETAMINOPHEN PRN TAB: 5; 325 TABLET ORAL at 10:10

## 2017-09-02 RX ADMIN — IBUPROFEN SCH MG: 800 TABLET, FILM COATED ORAL at 12:06

## 2017-09-02 RX ADMIN — DOCUSATE SODIUM SCH MG: 100 CAPSULE, LIQUID FILLED ORAL at 10:06

## 2017-09-02 NOTE — PDOC PROGRESS REPORT
Subjective-OB


Subjective: 


Post Delivery Day: 1








32 year old.  Denies any needs at this time, lochia is stable, pain well 

controlled, voiding without difficulty, passing gas, tolerating diet. 


 








Physical Exam (OB)


Vital Signs: 


 











Temp Pulse Resp BP Pulse Ox


 


 98.0 F   93   16   136/93 H  100 


 


 17 07:54  17 07:54  17 07:54  17 07:54  17 07:54








 Intake & Output











 17





 06:59 06:59 06:59


 


Intake Total  875 


 


Output Total  1900 


 


Balance  -1025 


 


Weight 127.3 kg  














- PIH/Pre-Eclampsia


DTR's: 2 +


Clonus: Negative


Headache: Absent


Epigastric Pain: No


Visual Changes: No





- 


Dressing Removed: No


Incision: Dressing





- Lochia


Lochia Amount: Small 10-25 ml


Lochia Color: Rubra/Red





- Abdomen


Description: Soft, Round


Hernia Present: No


Fundal Description: Firm, Midline


Fundal Height: u/u - u/2





Objective-Diagnostic


Laboratory: 


 





 17 07:39 





 











  17





  07:39


 


WBC  8.9


 


RBC  3.70 L


 


Hgb  9.9 L


 


Hct  30.1 L


 


MCV  82


 


MCH  26.6 L


 


MCHC  32.7


 


RDW  14.7 H


 


Plt Count  133 L














Assessment and Plan(PN)





- Assessment and Plan


(1) Status post repeat low transverse  section


Is this a current diagnosis for this admission?: Yes   


Plan: 


routine post op care








(2) Obesity affecting pregnancy


Qualifiers: 


   Trimester: third trimester   Qualified Code(s): O99.213 - Obesity 

complicating pregnancy, third trimester   


Is this a current diagnosis for this admission?: Yes   





(3) GDM, class A1


Is this a current diagnosis for this admission?: Yes   


Plan: 


yearly follow up








(4) History of postpartum depression


Is this a current diagnosis for this admission?: Yes   


Plan: 


d/c planning, close f/u








(5) Acute blood loss anemia


Is this a current diagnosis for this admission?: Yes   


Plan: 


routine pp care


ferrous sulfate











- Time Spent with Patient


Time with patient: Less than 15 minutes


Critical Time spent with patient: Less than 15 minutes


Medications reviewed and adjusted accordingly: Yes





- Disposition


Anticipated Discharge: Home


Within: within 24 hours

## 2017-09-03 VITALS — SYSTOLIC BLOOD PRESSURE: 131 MMHG | DIASTOLIC BLOOD PRESSURE: 78 MMHG

## 2017-09-03 RX ADMIN — DOCUSATE SODIUM SCH MG: 100 CAPSULE, LIQUID FILLED ORAL at 10:21

## 2017-09-03 RX ADMIN — PRENATAL W/O A VIT W/ FE FUMARATE-FA CAP 106.5-1 MG SCH CAP: 106.5 CAPSULE ORAL at 10:21

## 2017-09-03 RX ADMIN — IBUPROFEN SCH MG: 800 TABLET, FILM COATED ORAL at 00:39

## 2017-09-03 RX ADMIN — IBUPROFEN SCH MG: 800 TABLET, FILM COATED ORAL at 05:24

## 2017-09-03 RX ADMIN — OXYCODONE AND ACETAMINOPHEN PRN TAB: 5; 325 TABLET ORAL at 08:13

## 2017-09-03 NOTE — PDOC DISCHARGE SUMMARY
Final Diagnosis


Discharge Date: 17





- Final Diagnosis


(1) Status post repeat low transverse  section


Is this a current diagnosis for this admission?: Yes   





(2) Obesity affecting pregnancy


Is this a current diagnosis for this admission?: Yes   





(3) GDM, class A1


Is this a current diagnosis for this admission?: Yes   





(4) History of postpartum depression


Is this a current diagnosis for this admission?: Yes   





(5) Acute blood loss anemia


Is this a current diagnosis for this admission?: Yes   





Discharge Data





- Discharge Medication


Home Medications: 








Metformin HCl [Glucophage] 500 mg PO BID PRN 02/10/15 


Docusate Sodium [Colace 100 mg Capsule] 100 mg PO BID #60 capsule 17 


Ferrous Sulfate [Albafort] 325 mg PO BID #60 tablet 17 


Ibuprofen [Motrin 800 mg Tablet] 800 mg PO Q6 #60 tablet 17 


Oxycodone HCl/Acetaminophen [Percocet 5-325 mg Tablet] 2 tab PO Q4HP PRN #30 

tablet 17 








Gestational Age: 39


Reason(s) for Admission: Ceasarean Section-Repeat


Prenatal Procedures: NST


Intrapartum Procedure(s): : Low Cervical, Transverse, Tubal Ligation





- [place] Data


  ** Baby 1 Female


Apgar at 1 minute: 8


Apgar at 5 minutes: 9


Weight: 3635 kg


Home with Mother: Yes


Complications: No





- Diagnosis Test


Laboratory: 


 











Temp Pulse Resp BP Pulse Ox


 


 97.9 F   92   16   120/75   98 


 


 17 07:38  17 07:38  17 07:38  17 07:38  17 07:38








 











  17





  10:16 10:20 07:39


 


RBC   4.37  3.70 L


 


Hgb   11.6 L  9.9 L


 


Hct   34.9 L  30.1 L


 


Urine Opiates Screen  NEGATIVE  














- Discharge information/Instructions


Discharge Activity: Activity As Tolerated, No Lifting Over 10 Pounds, Pelvic 

Rest, No tub bath


Discharge Diet: Regular


Disposition: HOME, SELF-CARE


Follow up with: [facility]


in: 1, Weeks

## 2017-10-15 NOTE — OPERATIVE REPORT E
Operative Report



NAME: JACQUELINE AGRCIA

MRN:  [id number]          : 1985 AGE:  32Y

DATE OF SURGERY: 2017                        ROOM: 210



PREOPERATIVE DIAGNOSES:

1.  A 39-week intrauterine pregnancy.

2.  History of  section x3, for repeat.



POSTOPERATIVE DIAGNOSES:

1.  A 39-week intrauterine pregnancy.

2.  History of  section x3, for repeat.



SURGEON:

Sloan Briceño D.O.



ASSISTANT:

None.



PROCEDURE:

Repeat low-transverse  section.



ANESTHESIA:

Spinal.



COMPLICATIONS:

None.



ESTIMATED BLOOD LOSS:

600 mL.



PATHOLOGY:

Placenta.



FINDINGS:

1.  Viable female infant at 8:14 a.m. on 2017.  Apgar 8 at 1

and 9 at 5.

2.  Normal appearing bilateral fallopian tubes and ovaries.



PROCEDURE:

Patient was taken to the operating room where her spinal anesthesia was

administered.  Once this was done, she was placed in the dorsal supine

position with a leftward tilt upon the operating room table.  She was then

prepped and draped in normal sterile fashion.  A scalpel was then used to

make a Pfannenstiel skin incision.  The skin incision was carried down

through the subcutaneous tissue to the layer of the fascia.  Fascia was

then incised in the midline.  The fascial incision was then extended

bilaterally using the Bovie cautery.  The superior fascial edge was gasped

by Kocher clamps, elevated, and the rectus muscles dissected off sharply

and bluntly.  Attention was then turned to the inferior fascial edge which

was grasped by Kocher clamps, elevated and the rectus muscles were

dissected off sharply and bluntly.  The rectus muscles were then 

in the midline, perineum identified, and entered bluntly with the

surgeon's hands.  A bladder blade was inserted.  A scalpel was then used

to make a low transverse hysterotomy incision.  The infant was found to be

in the cephalad position and delivered through this incision without

difficulty and atraumatically.  The nose and mouth were suctioned.  Cord

was clamped and cut.  The infant was handed off to the awaiting nurses.



The placenta was then manually removed from the uterus.  The uterus was

then exteriorized and cleared of all clots and debris.  The hysterotomy

incision was then reapproximated using 2 layers of 1-0 Vicryl in a running

locking fashion.  Following closure of the second layer, excellent

hemostasis was noted. The uterus was then returned to the abdomen.  Again,

hysterotomy incision was reinspected and found to have excellent

hemostasis.  The rectus muscles were then reapproximated using 1-0 Vicryl

interrupted sutures.  The fascia was then closed using 1-0 Vicryl in a

running nonlocking fashion.  The subcutaneous space was made hemostatic

using Bovie cautery.  The skin was closed with absorbable staples, covered

with an OpSite, and with a pressure dressing.  At this point in time, the

procedure was terminated.  All sponge, lap, and needle counts were correct

x2.  Patient tolerated the procedure well.  Patient was taken to the

recovery room in stable condition.







DICTATING PHYSICIAN:  Sloan Briceño DO





[id number]                  DT: 10/15/2017    0939

PHY#: [id number]            DD: 10/15/2017    0859

ID:   [id number]           JOB#: [id number]       ACCT: [id number]



cc:Sloan Briceño D.O.

>

## 2017-10-21 ENCOUNTER — HOSPITAL ENCOUNTER (EMERGENCY)
Dept: HOSPITAL 62 - ER | Age: 32
Discharge: HOME | End: 2017-10-21
Payer: COMMERCIAL

## 2017-10-21 VITALS — SYSTOLIC BLOOD PRESSURE: 136 MMHG | DIASTOLIC BLOOD PRESSURE: 98 MMHG

## 2017-10-21 DIAGNOSIS — J02.0: Primary | ICD-10-CM

## 2017-10-21 DIAGNOSIS — R50.9: ICD-10-CM

## 2017-10-21 DIAGNOSIS — R00.0: ICD-10-CM

## 2017-10-21 DIAGNOSIS — R11.2: ICD-10-CM

## 2017-10-21 PROCEDURE — 87880 STREP A ASSAY W/OPTIC: CPT

## 2017-10-21 PROCEDURE — 99284 EMERGENCY DEPT VISIT MOD MDM: CPT

## 2017-10-21 PROCEDURE — 87804 INFLUENZA ASSAY W/OPTIC: CPT

## 2017-10-21 PROCEDURE — S0119 ONDANSETRON 4 MG: HCPCS

## 2017-10-21 PROCEDURE — 96372 THER/PROPH/DIAG INJ SC/IM: CPT

## 2017-10-21 NOTE — ER DOCUMENT REPORT
ED General





- General


Chief Complaint: Nausea/Vomiting


Stated Complaint: FLU LIKE SYMPTOMS


Time Seen by Provider: 10/21/17 09:18


Mode of Arrival: Ambulatory


Information source: Patient


Notes: 





32-year-old female presents with complaints of one-week duration of not feeling 

well.  Patient notes over the past day she has had fevers has been having sore 

throat.  Patient is unsure of any sick contacts.  Patient denies any current 

vomiting but has been nauseous and vomited in the past week


TRAVEL OUTSIDE OF THE [place] IN LAST 30 DAYS: No





- HPI


Onset: Last week


Onset/Duration: Persistent


Quality of pain: Achy


Severity: Mild


Pain Level: 1


Associated symptoms: Fever, Nausea, Vomiting, Sore throat


Exacerbated by: Food


Relieved by: Denies


Similar symptoms previously: No


Recently seen / treated by doctor: No





- Related Data


Allergies/Adverse Reactions: 


 





No Known Allergies Allergy (Unverified 10/21/17 09:55)


 








Home Medications: 


 Current Home Medications





No Home Medications  10/21/17 [History]











Past Medical History





- Social History


Smoking Status: Never Smoker


Cigarette use (# per day): No


Chew tobacco use (# tins/day): No


Smoking Education Provided: No


Frequency of alcohol use: None


Drug Abuse: None


Family History: DM, Hypertension





- Past Medical History


Cardiac Medical History: Reports: Hx Hypertension - no meds


   Denies: Hx Coronary Artery Disease, Hx Heart Attack, Hx Heart Murmur


Pulmonary Medical History: 


   Denies: Hx Asthma, Hx Bronchitis, Hx COPD, Hx Pneumonia


Neurological Medical History: Denies: Hx Cerebrovascular Accident, Hx Seizures


Endocrine Medical History: Denies: Hx Hyperthyroidism, Hx Hypothyroidism


Renal/ Medical History: Reports: Hx Ovarian Cysts.  Denies: Hx Peritoneal 

Dialysis, Hx Pelvic Inflammatory Disease


Malignancy Medical History: Denies: Hx Breast Cancer, Hx Cervical Cancer, Hx 

Ovarian Cancer


Musculoskeltal Medical History: Denies Hx Arthritis


Past Surgical History: Reports: Hx  Section, Hx Dilation and Curettage.

  Denies: Hx Hysterectomy





- Immunizations


Hx Diphtheria, Pertussis, Tetanus Vaccination: Yes





Review of Systems





- Review of Systems


Notes: 





REVIEW OF SYSTEMS:


CONSTITUTIONAL : Admits to fever


EENT:   Admits to sore throat


CARDIOVASCULAR:  Denies chest pain.  Denies palpitations or racing or irregular 

heart beat.  Denies ankle edema.


RESPIRATORY:  Denies cough, cold, or chest congestion.  Denies shortness of 

breath, difficulty breathing, or wheezing.


GASTROINTESTINAL:  Denies abdominal pain or distention.  Denies nausea, vomiting

, or diarrhea.  Denies blood in vomitus, stools, or per rectum.  Denies black, 

tarry stools.  Denies constipation. 


GENITOURINARY:  Denies difficulty urinating, painful urination, burning, 

frequency, blood in urine, or discharge.


FEMALE  GENITOURINARY:  Denies vaginal bleeding, heavy or abnormal periods, 

irregular periods.  Denies vaginal discharge or odor. 


MUSCULOSKELETAL:  Denies back or neck pain or stiffness.  Denies joint pain or 

swelling.


SKIN:   Denies rash, lesions or sores.


HEMATOLOGIC :   Denies easy bruising or bleeding.


LYMPHATIC:  Denies swollen, enlarged glands.


NEUROLOGICAL:  Denies confusion or altered mental status.  Denies passing out 

or loss of consciousness.  Denies dizziness or lightheadedness.  Denies 

headache.  Denies weakness or paralysis or loss of use of either side.  Denies 

problems with gait or speech.  Denies sensory loss, numbness, or tingling.  

Denies seizures.


PSYCHIATRIC:  Denies anxiety or stress.  Denies depression, suicidal ideation, 

or homicidal ideation.





ALL OTHER SYSTEMS REVIEWED AND NEGATIVE.











PHYSICAL EXAMINATION:





GENERAL: Well-appearing, well-nourished and in no acute distress.





HEAD: Atraumatic, normocephalic.





EYES: Pupils equal round and reactive to light, extraocular movements intact, 

conjunctiva are normal.





ENT: Bilateral tonsillar erythema with exudate on the right uvula midline no 

abscess noted





NECK: Normal range of motion, supple without lymphadenopathy





LUNGS: Breath sounds clear to auscultation bilaterally and equal.  No wheezes 

rales or rhonchi.





HEART: Tachycardic


ABDOMEN: Soft, nontender, nondistended abdomen.  No guarding, no rebound.  No 

masses appreciated.





Female : deferred





Musculoskeletal: Normal range of motion, no pitting or edema.  No cyanosis.





NEUROLOGICAL: Cranial nerves grossly intact.  Normal speech, normal gait.  

Normal sensory, motor exams





PSYCH: Normal mood, normal affect.





SKIN: Warm, Dry, normal turgor, no rashes or lesions noted.

















Dictation was performed using Dragon voice recognition software





Physical Exam





- Vital signs


Vitals: 


 











Temp Pulse Resp BP Pulse Ox


 


 100.3 F   130 H  18   143/106 H  98 


 


 10/21/17 09:07  10/21/17 09:07  10/21/17 09:07  10/21/17 09:07  10/21/17 09:07














Course





- Re-evaluation


Re-evalutation: 





10/21/17 15:20


Patient is noted to be febrile tachycardic with sore throat.  Rapid strep was 

positive for apparent strep pharyngitis.  Patient requests IM injection which 

has been given to her.  She is otherwise well-appearing obviously ill from the 

strep throat but overall nontoxic











After performing a Medical Screening Examination, I estimate there is LOW risk 

for ACUTE CORONARY SYNDROME, RESPIRATORY FAILURE, SEPSIS OR MENINGITIS, thus I 

consider the discharge disposition reasonable.  I have reevaluated this patient 

multiple times and no significant life threatening changes are noted. The 

patient and I have discussed the diagnosis and risks, and we agree with 

discharging home with close follow-up. We also discussed returning to the 

Emergency Department immediately if new or worsening symptoms occur. We have 

discussed the symptoms which are most concerning (e.g., changing or worsening 

pain, trouble swallowing or breathing, neck stiffness, fever) that necessitate 

immediate return.





- Vital Signs


Vital signs: 


 











Temp Pulse Resp BP Pulse Ox


 


 100.3 F   130 H  18   143/106 H  98 


 


 10/21/17 09:07  10/21/17 09:07  10/21/17 09:07  10/21/17 09:07  10/21/17 09:07














Discharge





- Discharge


Clinical Impression: 


 Strep pharyngitis, Tachycardia





Fever


Qualifiers:


 Fever type: unspecified Qualified Code(s): R50.9 - Fever, unspecified





Condition: Stable


Disposition: HOME, SELF-CARE


Instructions:  Strep Throat ([facility])


Additional Instructions: 


Follow up with your physician tomorrow for further care or return to the ED 

IMMEDIATELY if symptoms worsen or new concerns occur. If you cannot afford to 

follow up with your primary care physician a list of low cost clinics have been 

provided at the end of your discharge papers as well.

## 2017-10-22 ENCOUNTER — HOSPITAL ENCOUNTER (INPATIENT)
Dept: HOSPITAL 62 - ER | Age: 32
LOS: 1 days | Discharge: HOME | DRG: 776 | End: 2017-10-23
Attending: FAMILY MEDICINE | Admitting: FAMILY MEDICINE
Payer: COMMERCIAL

## 2017-10-22 DIAGNOSIS — O99.89: ICD-10-CM

## 2017-10-22 DIAGNOSIS — M62.82: ICD-10-CM

## 2017-10-22 DIAGNOSIS — J02.0: ICD-10-CM

## 2017-10-22 DIAGNOSIS — E87.1: ICD-10-CM

## 2017-10-22 DIAGNOSIS — E83.42: ICD-10-CM

## 2017-10-22 DIAGNOSIS — E86.0: ICD-10-CM

## 2017-10-22 DIAGNOSIS — E66.01: ICD-10-CM

## 2017-10-22 LAB
ANION GAP SERPL CALC-SCNC: 13 MMOL/L (ref 5–19)
ANION GAP SERPL CALC-SCNC: 18 MMOL/L (ref 5–19)
ANISOCYTOSIS BLD QL SMEAR: (no result)
APPEARANCE UR: (no result)
BARBITURATES UR QL SCN: NEGATIVE
BASOPHILS NFR BLD MANUAL: 0 % (ref 0–2)
BILIRUB UR QL STRIP: (no result)
BUN SERPL-MCNC: 19 MG/DL (ref 7–20)
BUN SERPL-MCNC: 36 MG/DL (ref 7–20)
CALCIUM: 7.7 MG/DL (ref 8.4–10.2)
CALCIUM: 8.3 MG/DL (ref 8.4–10.2)
CHLORIDE SERPL-SCNC: 109 MMOL/L (ref 98–107)
CHLORIDE SERPL-SCNC: 96 MMOL/L (ref 98–107)
CO2 SERPL-SCNC: 20 MMOL/L (ref 22–30)
CO2 SERPL-SCNC: 22 MMOL/L (ref 22–30)
CREAT SERPL-MCNC: 0.9 MG/DL (ref 0.52–1.25)
CREAT SERPL-MCNC: 2.1 MG/DL (ref 0.52–1.25)
EOSINOPHIL NFR BLD MANUAL: 1 % (ref 0–6)
ERYTHROCYTE [DISTWIDTH] IN BLOOD BY AUTOMATED COUNT: 14.7 % (ref 11.5–14)
ERYTHROCYTE [DISTWIDTH] IN BLOOD BY AUTOMATED COUNT: 14.9 % (ref 11.5–14)
GLUCOSE SERPL-MCNC: 169 MG/DL (ref 75–110)
GLUCOSE SERPL-MCNC: 202 MG/DL (ref 75–110)
GLUCOSE UR STRIP-MCNC: 50 MG/DL
HCT VFR BLD CALC: 33.5 % (ref 36–47)
HCT VFR BLD CALC: 35.2 % (ref 36–47)
HGB BLD-MCNC: 10.9 G/DL (ref 12–15.5)
HGB BLD-MCNC: 11.7 G/DL (ref 12–15.5)
HGB HCT DIFFERENCE: -0.1
HGB HCT DIFFERENCE: -0.8
KETONES UR STRIP-MCNC: NEGATIVE MG/DL
MAGNESIUM SERPL-MCNC: 1.2 MG/DL (ref 1.6–2.3)
MCH RBC QN AUTO: 25.3 PG (ref 27–33.4)
MCH RBC QN AUTO: 25.6 PG (ref 27–33.4)
MCHC RBC AUTO-ENTMCNC: 32.6 G/DL (ref 32–36)
MCHC RBC AUTO-ENTMCNC: 33.3 G/DL (ref 32–36)
MCV RBC AUTO: 77 FL (ref 80–97)
MCV RBC AUTO: 78 FL (ref 80–97)
METHADONE UR QL SCN: NEGATIVE
MICROCYTES BLD QL SMEAR: SLIGHT
NEUTS BAND NFR BLD MANUAL: 10 % (ref 3–5)
NITRITE UR QL STRIP: NEGATIVE
PCP UR QL SCN: NEGATIVE
PH UR STRIP: 5 [PH] (ref 5–9)
POIKILOCYTOSIS BLD QL SMEAR: (no result)
POTASSIUM SERPL-SCNC: 3.4 MMOL/L (ref 3.6–5)
POTASSIUM SERPL-SCNC: 4.1 MMOL/L (ref 3.6–5)
PROT UR STRIP-MCNC: >=500 MG/DL
RBC # BLD AUTO: 4.32 10^6/UL (ref 3.72–5.28)
RBC # BLD AUTO: 4.57 10^6/UL (ref 3.72–5.28)
SODIUM SERPL-SCNC: 136.4 MMOL/L (ref 137–145)
SODIUM SERPL-SCNC: 142.1 MMOL/L (ref 137–145)
SP GR UR STRIP: 1.03
TARGETS BLD QL SMEAR: SLIGHT
TOTAL CELLS COUNTED BLD: 100
URINE OPIATES LOW: NEGATIVE
UROBILINOGEN UR-MCNC: 2 MG/DL (ref ?–2)
VARIANT LYMPHS NFR BLD MANUAL: 3 % (ref 13–45)
WBC # BLD AUTO: 24.4 10^3/UL (ref 4–10.5)
WBC # BLD AUTO: 26.6 10^3/UL (ref 4–10.5)
WBC TOXIC VACUOLES BLD QL SMEAR: PRESENT

## 2017-10-22 PROCEDURE — 81025 URINE PREGNANCY TEST: CPT

## 2017-10-22 PROCEDURE — 80307 DRUG TEST PRSMV CHEM ANLYZR: CPT

## 2017-10-22 PROCEDURE — 84100 ASSAY OF PHOSPHORUS: CPT

## 2017-10-22 PROCEDURE — 82962 GLUCOSE BLOOD TEST: CPT

## 2017-10-22 PROCEDURE — 96372 THER/PROPH/DIAG INJ SC/IM: CPT

## 2017-10-22 PROCEDURE — 87045 FECES CULTURE AEROBIC BACT: CPT

## 2017-10-22 PROCEDURE — 84443 ASSAY THYROID STIM HORMONE: CPT

## 2017-10-22 PROCEDURE — 70490 CT SOFT TISSUE NECK W/O DYE: CPT

## 2017-10-22 PROCEDURE — G0378 HOSPITAL OBSERVATION PER HR: HCPCS

## 2017-10-22 PROCEDURE — 85025 COMPLETE CBC W/AUTO DIFF WBC: CPT

## 2017-10-22 PROCEDURE — 83735 ASSAY OF MAGNESIUM: CPT

## 2017-10-22 PROCEDURE — 81001 URINALYSIS AUTO W/SCOPE: CPT

## 2017-10-22 PROCEDURE — 36415 COLL VENOUS BLD VENIPUNCTURE: CPT

## 2017-10-22 PROCEDURE — 99284 EMERGENCY DEPT VISIT MOD MDM: CPT

## 2017-10-22 PROCEDURE — 96365 THER/PROPH/DIAG IV INF INIT: CPT

## 2017-10-22 PROCEDURE — 82553 CREATINE MB FRACTION: CPT

## 2017-10-22 PROCEDURE — 96376 TX/PRO/DX INJ SAME DRUG ADON: CPT

## 2017-10-22 PROCEDURE — 87040 BLOOD CULTURE FOR BACTERIA: CPT

## 2017-10-22 PROCEDURE — 82550 ASSAY OF CK (CPK): CPT

## 2017-10-22 PROCEDURE — 87493 C DIFF AMPLIFIED PROBE: CPT

## 2017-10-22 PROCEDURE — 89055 LEUKOCYTE ASSESSMENT FECAL: CPT

## 2017-10-22 PROCEDURE — 87186 SC STD MICRODIL/AGAR DIL: CPT

## 2017-10-22 PROCEDURE — 85027 COMPLETE CBC AUTOMATED: CPT

## 2017-10-22 PROCEDURE — 87088 URINE BACTERIA CULTURE: CPT

## 2017-10-22 PROCEDURE — 87086 URINE CULTURE/COLONY COUNT: CPT

## 2017-10-22 PROCEDURE — 80048 BASIC METABOLIC PNL TOTAL CA: CPT

## 2017-10-22 PROCEDURE — 96375 TX/PRO/DX INJ NEW DRUG ADDON: CPT

## 2017-10-22 PROCEDURE — 96361 HYDRATE IV INFUSION ADD-ON: CPT

## 2017-10-22 PROCEDURE — 82272 OCCULT BLD FECES 1-3 TESTS: CPT

## 2017-10-22 PROCEDURE — 87205 SMEAR GRAM STAIN: CPT

## 2017-10-22 PROCEDURE — 90686 IIV4 VACC NO PRSV 0.5 ML IM: CPT

## 2017-10-22 PROCEDURE — 83036 HEMOGLOBIN GLYCOSYLATED A1C: CPT

## 2017-10-22 RX ADMIN — Medication SCH ML: at 22:24

## 2017-10-22 RX ADMIN — MAGNESIUM SULFATE IN DEXTROSE SCH ML: 10 INJECTION, SOLUTION INTRAVENOUS at 10:11

## 2017-10-22 RX ADMIN — SODIUM CHLORIDE PRN ML: 9 INJECTION, SOLUTION INTRAVENOUS at 04:11

## 2017-10-22 RX ADMIN — MAGNESIUM SULFATE IN DEXTROSE SCH ML: 10 INJECTION, SOLUTION INTRAVENOUS at 06:00

## 2017-10-22 RX ADMIN — SODIUM CHLORIDE PRN ML: 9 INJECTION, SOLUTION INTRAVENOUS at 22:22

## 2017-10-22 RX ADMIN — MAGNESIUM SULFATE IN DEXTROSE SCH ML: 10 INJECTION, SOLUTION INTRAVENOUS at 12:31

## 2017-10-22 RX ADMIN — FAMOTIDINE SCH MG: 20 TABLET, FILM COATED ORAL at 22:22

## 2017-10-22 RX ADMIN — CEFEPIME HYDROCHLORIDE SCH ML: 1 INJECTION, SOLUTION INTRAVENOUS at 22:22

## 2017-10-22 RX ADMIN — SODIUM CHLORIDE PRN ML: 9 INJECTION, SOLUTION INTRAVENOUS at 08:37

## 2017-10-22 RX ADMIN — FAMOTIDINE SCH MG: 20 TABLET, FILM COATED ORAL at 12:06

## 2017-10-22 RX ADMIN — POTASSIUM CHLORIDE SCH ML: 29.8 INJECTION, SOLUTION INTRAVENOUS at 11:48

## 2017-10-22 RX ADMIN — SODIUM CHLORIDE PRN ML: 9 INJECTION, SOLUTION INTRAVENOUS at 04:12

## 2017-10-22 RX ADMIN — MAGNESIUM SULFATE IN DEXTROSE SCH ML: 10 INJECTION, SOLUTION INTRAVENOUS at 05:57

## 2017-10-22 RX ADMIN — Medication SCH ML: at 13:45

## 2017-10-22 RX ADMIN — POTASSIUM CHLORIDE SCH ML: 29.8 INJECTION, SOLUTION INTRAVENOUS at 14:39

## 2017-10-22 RX ADMIN — SODIUM CHLORIDE PRN ML: 9 INJECTION, SOLUTION INTRAVENOUS at 13:41

## 2017-10-22 NOTE — PROGRESS NOTE
Provider Note


Provider Note: 





Patient seen and examined.  I did go ahead and start her on some IV antibiotics 

since she had a positive strep and recently postpartum 21 days.  Recheck labs 

in the a.m.  CPK BMP

## 2017-10-22 NOTE — PDOC H&P
History of Present Illness


Admission Date/PCP: 


  10/22/17 06:10





  NIVIA QUIROZ MD





History of Present Illness: 


JACQUELINE GARCIA is a 32 year old female with past medical history of gestational 

diabetes and preeclampsia who reports that starting about a week and half ago 

her throat felt funny and began to worsen over the next last several days.  She 

presented into the emergency department about 48 hours ago with complaints of 

fever accompanied by hallucinations and shaking chills.  Patient was found to 

be positive for group a strep by rapid strep and was given 1.2 million units of 

intramuscular penicillin.  Patient reports that she went home and began having 

vomiting.  She did report that she has been having diarrhea as well over the 

last several days and has been unable to hold her stool.  She denies raw or 

undercooked meat, raw or undercooked seafood, or foreign travel.  She has not 

been breast-feeding well she has been feeling poorly.  She also reports that 

she has been having significant bleeding still.  She reports that it has been 

heavier than a period at times.  Patient complains of generalized pain and 

nausea and vomiting.  Patient underwent CT of the soft tissues of the neck in 

the emergency department which is found to be negative.  She is referred to 

hospital service for acute renal failure and intractable vomiting.





Past Medical History


Cardiac Medical History: Reports: Hypertension - no meds


   Denies: Coronary Artery Disease, Myocardial Infarction, Heart Murmur


Pulmonary Medical History: 


   Denies: Asthma, Bronchitis, Chronic Obstructive Pulmonary Disease (COPD), 

Pneumonia


Neurological Medical History: 


   Denies: Seizures


Endocrine Medical History: Reports: Gestational Diabetes, Obesity


   Denies: Hyperthyroidism, Hypothyroidism


Malignancy Medical History: 


   Denies: Breast Cancer, Cervical Cancer, Ovarian Cancer


Musculoskeltal Medical History: 


   Denies: Arthritis


Hematology: Reports: Anemia





Past Surgical History


Past Surgical History: Reports:  Section


   Denies: Hysterectomy





Social History


Smoking Status: Never Smoker


Frequency of Alcohol Use: Rare


Hx Recreational Drug Use: No


Hx Prescription Drug Abuse: No





- Advance Directive


Resuscitation Status: Full Code


Surrogate healthcare decision maker:: 





AuntJULIAN





Family History


Family History: CAD, DM, Hypertension


Parental Family History Reviewed: Yes


Children Family History Reviewed: Yes


Sibling(s) Family History Reviewed.: Yes





Medication/Allergy


Home Medications: 








No Home Medications  10/21/17 








Allergies/Adverse Reactions: 


 





No Known Allergies Allergy (Unverified 10/22/17 01:37)


 











Review of Systems


Constitutional: PRESENT: anorexia, chills, fatigue, fever(s).  ABSENT: headache(

s), weight gain, weight loss


Eyes: ABSENT: visual disturbances


Ears: ABSENT: hearing changes


Nose, Mouth, and Throat: PRESENT: sore throat


Cardiovascular: ABSENT: chest pain, dyspnea on exertion, edema, orthropnea, 

palpitations


Respiratory: ABSENT: cough, dyspnea, hemoptysis, sputum


Gastrointestinal: PRESENT: diarrhea, nausea, vomiting.  ABSENT: abdominal pain, 

constipation, hematemesis, hematochezia


Genitourinary: PRESENT: dysuria.  ABSENT: hematuria


Musculoskeletal: ABSENT: joint swelling


Integumentary: ABSENT: rash, wounds


Neurological: ABSENT: abnormal gait, abnormal speech, confusion, dizziness, 

focal weakness, syncope


Psychiatric: ABSENT: anxiety, depression, homidical ideation, suicidal ideation


Endocrine: ABSENT: cold intolerance, heat intolerance, polydipsia, polyphagia, 

polyuria


Hematologic/Lymphatic: ABSENT: easy bleeding, easy bruising





Physical Exam


Vital Signs: 


 











Temp Pulse Resp BP Pulse Ox


 


 98.4 F   130 H  18   132/47 H  100 


 


 10/22/17 01:37  10/22/17 01:37  10/22/17 01:37  10/22/17 06:00  10/22/17 06:01











General appearance: PRESENT: mild distress, morbidly obese, well-developed, well

-nourished


Head exam: PRESENT: atraumatic, normocephalic


Eye exam: PRESENT: conjunctiva pink, EOMI, PERRLA.  ABSENT: scleral icterus


Ear exam: PRESENT: normal external ear exam


Mouth exam: PRESENT: dry mucosa, neck supple, tongue midline


Throat exam: PRESENT: tonsillar exudate.  ABSENT: post pharyngeal erythema, 

tonsillar erythema, tonsillogmegaly


Neck exam: PRESENT: lymphadenopathy - Shotty anterior cervical.  ABSENT: JVD, 

meningismus, tenderness, thyromegaly, tracheal deviation


Respiratory exam: PRESENT: clear to auscultation aster, symmetrical, unlabored.  

ABSENT: accessory muscle use, rales, rhonchi, tachypnea, wheezes


Cardiovascular exam: PRESENT: RRR, +S1, +S2, tachycardia.  ABSENT: diastolic 

murmur, rubs, systolic murmur


Pulses: PRESENT: normal dorsalis pedis pul


Vascular exam: PRESENT: pallor


GI/Abdominal exam: PRESENT: normal bowel sounds, soft.  ABSENT: distended, firm

, guarding, mass, Lind's sign, organolmegaly, rebound, rigid, tenderness


Rectal exam: PRESENT: deferred


Extremities exam: PRESENT: full ROM.  ABSENT: calf tenderness, clubbing, pedal 

edema


Neurological exam: PRESENT: alert, awake, oriented to person, oriented to place

, oriented to time, oriented to situation, CN II-XII grossly intact.  ABSENT: 

motor sensory deficit


Psychiatric exam: PRESENT: appropriate affect, normal mood.  ABSENT: homicidal 

ideation, suicidal ideation


Skin exam: PRESENT: dry, intact, warm.  ABSENT: cyanosis, rash





Results


Laboratory Results: 





 











  10/22/17 10/22/17 10/22/17





  04:05 04:05 05:28


 


Sodium  136.4 L  


 


Potassium  3.4 L  


 


Chloride  96 L  


 


Carbon Dioxide  22  


 


Anion Gap  18  


 


BUN  36 H  


 


Creatinine  2.10 H  


 


Est GFR ( Amer)  33 L  


 


Glucose  169 H  


 


Calcium  8.3 L  


 


Magnesium  1.2 L*  


 


Creatine Kinase   1168 H 


 


Ur Specific Gravity    1.029


 


Urine Protein    >=500 H


 


Urine Glucose (UA)    50 H


 


Urine Ketones    NEGATIVE


 


Urine Blood    LARGE H


 


Urine Bilirubin    SMALL H


 


Urine Urobilinogen    2.0 H


 


Ur Leukocyte Esterase    MODERATE H


 


Urine WBC (Auto)    13


 


Urine HCG, Qual   














  10/22/17





  05:28


 


Sodium 


 


Potassium 


 


Chloride 


 


Carbon Dioxide 


 


Anion Gap 


 


BUN 


 


Creatinine 


 


Est GFR (African Amer) 


 


Glucose 


 


Calcium 


 


Magnesium 


 


Creatine Kinase 


 


Ur Specific Gravity 


 


Urine Protein 


 


Urine Glucose (UA) 


 


Urine Ketones 


 


Urine Blood 


 


Urine Bilirubin 


 


Urine Urobilinogen 


 


Ur Leukocyte Esterase 


 


Urine WBC (Auto) 


 


Urine HCG, Qual  NEGATIVE








Impressions: 


 





Soft Tissue Neck CT  10/22/17 05:11


IMPRESSION:  Mild bilateral cervical adenopathy.  Otherwise, no acute findings 

in the soft tissues of the neck on unenhanced CT.


 














Assessment & Plan





- Diagnosis


(1) Acute renal failure


Qualifiers: 


   Acute renal failure type: unspecified   Qualified Code(s): N17.9 - Acute 

kidney failure, unspecified   


Is this a current diagnosis for this admission?: Yes   


Plan: 


Likely secondary to dehydration.  Will rehydrate.  Place patient on NS at 200 

mL an hour








(2) Hyponatremia


Is this a current diagnosis for this admission?: Yes   


Plan: 


Likely secondary to intravascular volume depletion.  Will recheck








(3) Impaired fasting glucose


Is this a current diagnosis for this admission?: Yes   


Plan: 


Check hemoglobin A1c and place on Accu-Cheks


History of gestational diabetes








(4) Rhabdomyolysis


Qualifiers: 


   Rhabdomyolysis type: non-traumatic   Qualified Code(s): M62.82 - 

Rhabdomyolysis   


Is this a current diagnosis for this admission?: Yes   


Plan: 


Possibly secondary to febrile illness.  Repeat CPK.  Patient will be hydrated 

and electro lites monitored.  Concern for arrhythmia








(5) Hypokalemia


Is this a current diagnosis for this admission?: Yes   


Plan: 


Replete and recheck








(6) Hypomagnesemia


Is this a current diagnosis for this admission?: Yes   


Plan: 


Replete and recheck.  Monitor for arrhythmia








(7) Vomiting


Qualifiers: 


   Vomiting type: unspecified   Vomiting Intractability: intractable   Nausea 

presence: with nausea   Qualified Code(s): R11.2 - Nausea with vomiting, 

unspecified   


Is this a current diagnosis for this admission?: Yes   


Plan: 


Place patient on Zofran








(8) Strep pharyngitis


Is this a current diagnosis for this admission?: Yes   


Plan: 


Patient has received definitive treatment for this








(9) Morbid obesity with BMI of 40.0-44.9, adult


Is this a current diagnosis for this admission?: Yes   





(10) Diarrhea


Qualifiers: 


   Diarrhea type: unspecified type   Qualified Code(s): R19.7 - Diarrhea, 

unspecified   


Is this a current diagnosis for this admission?: Yes   


Plan: 


We will check stool culture and C. difficile








- Time


Time Spent: 50 to 70 Minutes


Medications reviewed and adjusted accordingly: Yes


Anticipated discharge: Home


Within: within 48 hours





- Inpatient Certification


Based on my medical assessment, after consideration of the patient's 

comorbidities, presenting symptoms, or acuity I expect that the services needed 

warrant INPATIENT care.: Yes


I certify that my determination is in accordance with my understanding of 

Medicare's requirements for reasonable and necessary INPATIENT services [42 CFR 

412.3e].: Yes


Medical Necessity: Need For IV Fluids


Post Hospital Care: D/C Planner Documentation

## 2017-10-22 NOTE — ER DOCUMENT REPORT
ED General





- General


Chief Complaint: Sore Throat


Stated Complaint: SORE THROAT


Time Seen by Provider: 10/22/17 02:54


Notes: 





Patient is a 32-year-old female presents with complaints of a sore throat.  

Patient says that she has had a sore throat throat for several days now.  She 

had a fever 2 days ago.  Yesterday she was seen in the ER and diagnosed with 

strep throat.  She was given a shot of penicillin.  She says that since then 

she has had continue sore throat and also has been vomiting has had a hard time 

holding down food or liquids.  She denies any further fevers.  She says her 

fevers seem to have resolved.  She denies any difficulty breathing or 

swallowing.  No other complaints at this time.


TRAVEL OUTSIDE OF THE [place] IN LAST 30 DAYS: No





- Related Data


Allergies/Adverse Reactions: 


 





No Known Allergies Allergy (Unverified 10/22/17 01:37)


 











Past Medical History





- Social History


Smoking Status: Never Smoker


Frequency of alcohol use: None


Drug Abuse: None


Family History: DM, Hypertension





- Past Medical History


Cardiac Medical History: Reports: Hx Hypertension - no meds


   Denies: Hx Coronary Artery Disease, Hx Heart Attack, Hx Heart Murmur


Pulmonary Medical History: 


   Denies: Hx Asthma, Hx Bronchitis, Hx COPD, Hx Pneumonia


Neurological Medical History: Denies: Hx Cerebrovascular Accident, Hx Seizures


Endocrine Medical History: Denies: Hx Hyperthyroidism, Hx Hypothyroidism


Renal/ Medical History: Reports: Hx Ovarian Cysts.  Denies: Hx Peritoneal 

Dialysis, Hx Pelvic Inflammatory Disease


Malignancy Medical History: Denies: Hx Breast Cancer, Hx Cervical Cancer, Hx 

Ovarian Cancer


Musculoskeltal Medical History: Denies Hx Arthritis


Past Surgical History: Reports: Hx  Section, Hx Dilation and Curettage.

  Denies: Hx Hysterectomy





- Immunizations


Hx Diphtheria, Pertussis, Tetanus Vaccination: Yes





Review of Systems





- Review of Systems


Notes: 





My Normal Review Basic





REVIEW OF SYSTEMS:


CONSTITUTIONAL :  Denies fever,  chills, or sweats.  Denies recent illness.


EENT: Sore throat.


RESPIRATORY:  Denies cough, cold, or chest congestion.  Denies shortness of 

breath, difficulty breathing, or wheezing.


GASTROINTESTINAL:  Denies abdominal pain.  Vomiting.


MUSCULOSKELETAL:  Denies neck or back pain or joint pain or swelling.


SKIN:   Denies rash or skin lesions.


NEUROLOGICAL:  Denies altered mental status or loss of consciousness.  Denies 

headache.  Denies weakness or paralysis or loss of use of either side.  Denies 

problems with gait or speech.  Denies sensory or motor loss.


ALL OTHER SYSTEMS REVIEWED AND NEGATIVE.





Physical Exam





- Vital signs


Vitals: 


 











Temp Pulse Resp BP Pulse Ox


 


 98.4 F   130 H  18   112/64   100 


 


 10/22/17 01:37  10/22/17 01:37  10/22/17 01:37  10/22/17 01:37  10/22/17 01:37














- Notes


Notes: 





General Appearance: Well nourished, alert, cooperative, no acute distress, mild 

obvious discomfort.


Vitals: reviewed, See vital signs table.


Head: no swelling or tenderness to the head


Eyes: PERRL, EOMI, Conjuctiva clear


Mouth: No decreasd moisture


Throat: Some erythema over the tonsillar beds.  Uvula is midline.  I do not see 

any swelling or edema in the peritonsillar area.  Nothing to suggest 

peritonsillar abscess.  Patient's voice is normal.  She is able to fully open 

and close her jaw without difficulty.  She is handling secretions.


Neck: Supple, no neck tenderness, no swelling along neck.


Lungs: No wheezing, No rales, No rhonci, No accessory muscle use, good air 

exchange bilaterally.


Heart: Normal rate, Regular rythm, No murmur, no rub


Abdomen: Normal BS, soft, No rigidity, No abdominal tenderness, No guarding, no 

rebound, no abdominal masses, no organomegaly


Extremities: strength 5/5 in all extremities, good pulses in all extremities, 

no swelling or tenderness in the extremities, no edema.


Skin: warm, dry, appropriate color, no rash


Neuro: speech clear, oriented x 3, normal affect, responds appropriately to 

questions.





Course





- Re-evaluation


Re-evalutation: 





10/22/17 05:13


Patient's blood work is now back and her white count is very high at 26,000.  

Her labs also show severe dehydration.  Her creatinine is 2.1.  She is severely 

hypomagnesemic at 1.2.  I will give her IV magnesium.  We will send her 

straight to CT scan to make sure that there is not an underlying abscess I am 

not seeing on physical exam.  Patient continues to have complete control of her 

airway without any impending signs of airway compromise on exam.  Voice remains 

normal.


10/22/17 06:53








Recent CT scan was negative.  She continues you so signs of dehydration and 

rate current vomiting intractable vomiting.  Patient will be admitted for her 

intractable vomiting and dehydration and electrolyte abnormalities.  I did 

speak with Dr. Hillman who agrees to admit the patient.





Dictation of this chart was performed using voice recognition software; 

therefore, there may be some unintended grammatical errors.





- Vital Signs


Vital signs: 


 











Temp Pulse Resp BP Pulse Ox


 


 98.4 F   130 H  18   132/47 H  100 


 


 10/22/17 01:37  10/22/17 01:37  10/22/17 01:37  10/22/17 06:00  10/22/17 06:01














- Laboratory


Result Diagrams: 


 10/22/17 04:05





 10/22/17 04:05


Laboratory results interpreted by me: 


 











  10/22/17 10/22/17 10/22/17





  04:05 04:05 04:05


 


WBC  26.6 H  


 


Hgb  11.7 L  


 


Hct  35.2 L  


 


MCV  77 L  


 


MCH  25.6 L  


 


RDW  14.9 H  


 


Seg Neuts % (Manual)  83 H  


 


Band Neutrophils %  10 H  


 


Lymphocytes % (Manual)  3 L  


 


Abs Neuts (Manual)  24.7 H  


 


Sodium   136.4 L 


 


Potassium   3.4 L 


 


Chloride   96 L 


 


BUN   36 H 


 


Creatinine   2.10 H 


 


Est GFR ( Amer)   33 L 


 


Est GFR (Non-Af Amer)   27 L 


 


Glucose   169 H 


 


Calcium   8.3 L 


 


Magnesium   1.2 L* 


 


Creatine Kinase    1168 H


 


Urine Protein   


 


Urine Glucose (UA)   


 


Urine Blood   


 


Urine Bilirubin   


 


Urine Urobilinogen   


 


Ur Leukocyte Esterase   














  10/22/17





  05:28


 


WBC 


 


Hgb 


 


Hct 


 


MCV 


 


MCH 


 


RDW 


 


Seg Neuts % (Manual) 


 


Band Neutrophils % 


 


Lymphocytes % (Manual) 


 


Abs Neuts (Manual) 


 


Sodium 


 


Potassium 


 


Chloride 


 


BUN 


 


Creatinine 


 


Est GFR ( Amer) 


 


Est GFR (Non-Af Amer) 


 


Glucose 


 


Calcium 


 


Magnesium 


 


Creatine Kinase 


 


Urine Protein  >=500 H


 


Urine Glucose (UA)  50 H


 


Urine Blood  LARGE H


 


Urine Bilirubin  SMALL H


 


Urine Urobilinogen  2.0 H


 


Ur Leukocyte Esterase  MODERATE H














Discharge





- Discharge


Clinical Impression: 


 Sore throat, Renal insufficiency, Hypomagnesemia, Hypokalemia





Vomiting


Qualifiers:


 Vomiting type: unspecified Vomiting Intractability: intractable Nausea presence

: with nausea Qualified Code(s): R11.2 - Nausea with vomiting, unspecified





Condition: Stable


Disposition: ADMITTED AS OBSERVATION


Admitting Provider: Hospitalist

## 2017-10-22 NOTE — RADIOLOGY REPORT (SQ)
EXAM DESCRIPTION:  CT SOFT TISSUE NECK WITHOUT



COMPLETED DATE/TIME:  10/22/2017 5:38 am



REASON FOR STUDY:  leukocytosis, sore throat, vomiting



COMPARISON:  None.



TECHNIQUE:  Noncontrast scanning from skull base through lung apices with review of bone, soft tissue
 and lung windows.  Reconstructed coronal and sagittal MPR images reviewed.  All images stored on PAC
S.

All CT scanners at this facility use dose modulation, iterative reconstruction, and/or weight based d
osing when appropriate to reduce radiation dose to as low as reasonably achievable (ALARA).

[facility]: Dose Right  [facility]: CareDose    [facility]: Dose Right    [facility]: Teradose 4D    [facility]: Smart Technologies



RADIATION DOSE:  Up-to-date CT equipment and radiation dose reduction techniques were employed. CTDIv
ol: 17.8 mGy. DLP: 560 mGy-cm. mGy.



LIMITATIONS:  None.



FINDINGS:  SKULL BASE: Intact.

MAJOR SALIVARY GLANDS:  No inflammatory changes.

LYMPHADENOPATHY: Mildly enlarged lymph nodes are seen along the bilateral jugular chain measuring up 
to 12 mm in short axis on the right and 14 mm in short axis on the left.

MUCOSAL MASSES OR ASYMMETRY: No mucosal masses or asymmetry.

LARYNX/CORDS: No abnormal findings.

LUNG APICES: Clear.

BONES: Intact.

THYROID: Normal size.

PARANASAL SINUSES: Clear.



IMPRESSION:  Mild bilateral cervical adenopathy.  Otherwise, no acute findings in the soft tissues of
 the neck on unenhanced CT.



TECHNICAL DOCUMENTATION:  JOB ID:  [id number]

[id number]

Quality ID # [id number]: Final reports with documentation of one or more dose reduction techniques (e.g., Au
tomated exposure control, adjustment of the mA and/or kV according to patient size, use of iterative 
reconstruction technique)

 2011 [employer]- All Rights Reserved

## 2017-10-23 VITALS — DIASTOLIC BLOOD PRESSURE: 103 MMHG | SYSTOLIC BLOOD PRESSURE: 144 MMHG

## 2017-10-23 LAB
ANION GAP SERPL CALC-SCNC: 15 MMOL/L (ref 5–19)
BUN SERPL-MCNC: 16 MG/DL (ref 7–20)
CALCIUM: 7.6 MG/DL (ref 8.4–10.2)
CHLORIDE SERPL-SCNC: 110 MMOL/L (ref 98–107)
CO2 SERPL-SCNC: 20 MMOL/L (ref 22–30)
CREAT SERPL-MCNC: 0.81 MG/DL (ref 0.52–1.25)
ERYTHROCYTE [DISTWIDTH] IN BLOOD BY AUTOMATED COUNT: 14.9 % (ref 11.5–14)
GLUCOSE SERPL-MCNC: 181 MG/DL (ref 75–110)
HCT VFR BLD CALC: 33.1 % (ref 36–47)
HGB BLD-MCNC: 10.7 G/DL (ref 12–15.5)
HGB HCT DIFFERENCE: -1
MCH RBC QN AUTO: 25 PG (ref 27–33.4)
MCHC RBC AUTO-ENTMCNC: 32.2 G/DL (ref 32–36)
MCV RBC AUTO: 78 FL (ref 80–97)
POTASSIUM SERPL-SCNC: 4.2 MMOL/L (ref 3.6–5)
RBC # BLD AUTO: 4.27 10^6/UL (ref 3.72–5.28)
SODIUM SERPL-SCNC: 144.8 MMOL/L (ref 137–145)
WBC # BLD AUTO: 22.2 10^3/UL (ref 4–10.5)

## 2017-10-23 RX ADMIN — CEFEPIME HYDROCHLORIDE SCH ML: 1 INJECTION, SOLUTION INTRAVENOUS at 10:19

## 2017-10-23 RX ADMIN — SODIUM CHLORIDE PRN ML: 9 INJECTION, SOLUTION INTRAVENOUS at 05:09

## 2017-10-23 RX ADMIN — SODIUM CHLORIDE PRN ML: 9 INJECTION, SOLUTION INTRAVENOUS at 10:25

## 2017-10-23 RX ADMIN — INSULIN LISPRO PRN UNIT: 100 INJECTION, SOLUTION INTRAVENOUS; SUBCUTANEOUS at 10:17

## 2017-10-23 RX ADMIN — INSULIN LISPRO PRN UNIT: 100 INJECTION, SOLUTION INTRAVENOUS; SUBCUTANEOUS at 13:31

## 2017-10-23 RX ADMIN — Medication SCH ML: at 05:09

## 2017-10-23 RX ADMIN — FAMOTIDINE SCH MG: 20 TABLET, FILM COATED ORAL at 10:19

## 2017-10-23 NOTE — PDOC DISCHARGE SUMMARY
General





- Admit/Disc Date/PCP


Admission Date/Primary Care Provider: 


  10/22/17 09:04





  NIVIA QUIROZ MD





Discharge Date: 10/23/17





- Discharge Diagnosis


(1) Acute renal failure


Is this a current diagnosis for this admission?: Yes   





(2) Diarrhea


Is this a current diagnosis for this admission?: Yes   





(3) Hyponatremia


Is this a current diagnosis for this admission?: Yes   





(4) Morbid obesity with BMI of 40.0-44.9, adult


Is this a current diagnosis for this admission?: Yes   








- Additional Information


Resuscitation Status: Full Code


Discharge Diet: As Tolerated


Discharge Activity: Activity As Tolerated


Home Medications: 








Acetaminophen [Tylenol 325 mg Tablet] 650 mg PO Q4HP PRN  tablet 10/23/17 


Azithromycin 500 mg PO DAILY 7 Days  tablet 10/23/17 











History of Present Illness


History of Present Illness: 


JACQUELINE GARCIA is a 32 year old female








Hospital Course


Hospital Course: 





32-year-old female past medical history gestational diabetes who delivered 

approximately 21 days ago.  She had shaking chills fever recently diagnosed 

with strep throat and received IM penicillin as an outpatient.  Patient 

developed diarrhea nausea vomiting she was also found to have some acute renal 

failure secondary to her intractable vomiting and diarrhea and dehydration.  

Patient was brought into the emergency room she was given IV fluids tolerated 

well she just appeared very toxic so she was admitted overnight.  She was found 

to have rhabdo secondary to her dehydration and vomiting.  It rapidly improved.

  Patient was also found to have hyponatremia secondary to her intravascular 

volume depletion which improved after receiving IV fluids.  Since the patient 

recently given birth and she tested positive for strep right after birth felt 

that we should probably treat her since she was having some vaginal bleeding 

and discomfort for possible vaginitis or endometriaitis.  





However today after seen the patient she looks much better after receiving IV 

fluid hydration approximately 4 L she was given anti-medics and was started on 

some antibiotics which we will continue outpatient Zithromax 500 mg daily for 7 

days.





We felt that patient rapidly improved due to the unfortunate event that her 6-

year-old daughter was brought into the emergency room here with DKA and she was 

taken via helicopter to [place] to be treated.  Patient is a single mother 

and states she has no help except for her brother and her best friend who are 

both males and they really did not feel comfortable being with the patient's 

daughter.  So based on that patient states she really would like to be 

discharged home which I agree she much better today.  She was given discharge 

instructions to call or follow with primary care doctor in 1 week and her OB/

GYN on their recommendations.  Feel it patient stable to be discharged home so 

she can take care of her daughter who is apparently very critically sick.





Physical Exam


Vital Signs: 


 











Temp Pulse Resp BP Pulse Ox


 


 97.7 F   105 H  18   144/103 H  100 


 


 10/23/17 13:31  10/23/17 13:31  10/23/17 13:19  10/23/17 13:31  10/23/17 13:31








 Intake & Output











 10/22/17 10/23/17 10/24/17





 06:59 06:59 06:59


 


Intake Total  4290 


 


Balance  4290 


 


Weight  125.7 kg 











General appearance: PRESENT: no acute distress, well-developed, well-nourished


Head exam: PRESENT: atraumatic, normocephalic


Eye exam: PRESENT: conjunctiva pink, EOMI, PERRLA.  ABSENT: scleral icterus


Ear exam: PRESENT: normal external ear exam


Mouth exam: PRESENT: moist, tongue midline


Neck exam: ABSENT: carotid bruit, JVD, lymphadenopathy, thyromegaly


Respiratory exam: PRESENT: clear to auscultation aster.  ABSENT: rales, rhonchi, 

wheezes


Cardiovascular exam: PRESENT: RRR.  ABSENT: diastolic murmur, rubs, systolic 

murmur


Pulses: PRESENT: normal dorsalis pedis pul


Vascular exam: PRESENT: normal capillary refill


GI/Abdominal exam: PRESENT: normal bowel sounds, soft.  ABSENT: distended, 

guarding, mass, organolmegaly, rebound, tenderness


Rectal exam: PRESENT: deferred


Extremities exam: PRESENT: full ROM.  ABSENT: calf tenderness, clubbing, pedal 

edema


Neurological exam: PRESENT: alert, awake, oriented to person, oriented to place

, oriented to time, oriented to situation, CN II-XII grossly intact.  ABSENT: 

motor sensory deficit


Psychiatric exam: PRESENT: appropriate affect, normal mood.  ABSENT: homicidal 

ideation, suicidal ideation


Skin exam: PRESENT: dry, intact, warm.  ABSENT: cyanosis, rash





Results


Laboratory Results: 


 





 10/23/17 06:59 





 10/23/17 08:27 





 











  10/22/17 10/22/17 10/22/17





  17:15 22:03 22:32


 


WBC  24.4 H  


 


RBC  4.32  


 


Hgb  10.9 L  


 


Hct  33.5 L  


 


MCV  78 L  


 


MCH  25.3 L  


 


MCHC  32.6  


 


RDW  14.7 H  


 


Plt Count  161  


 


Sodium   142.1 


 


Potassium   4.1 


 


Chloride   109 H 


 


Carbon Dioxide   20 L 


 


Anion Gap   13 


 


BUN   19 


 


Creatinine   0.90 


 


Est GFR ( Amer)   > 60 


 


Est GFR (Non-Af Amer)   > 60 


 


Glucose   202 H 


 


Calcium   7.7 L 


 


Stool Occult Blood    NEGATIVE


 


Stool for White Cells   














  10/22/17 10/23/17 10/23/17





  22:32 06:59 06:59


 


WBC   22.2 H 


 


RBC   4.27 


 


Hgb   10.7 L 


 


Hct   33.1 L 


 


MCV   78 L 


 


MCH   25.0 L 


 


MCHC   32.2 


 


RDW   14.9 H 


 


Plt Count   183 


 


Sodium    Cancelled


 


Potassium    Cancelled


 


Chloride    Cancelled


 


Carbon Dioxide    Cancelled


 


Anion Gap    Cancelled


 


BUN    Cancelled


 


Creatinine    Cancelled


 


Est GFR ( Amer)    Cancelled


 


Est GFR (Non-Af Amer)    Cancelled


 


Glucose    Cancelled


 


Calcium    Cancelled


 


Stool Occult Blood   


 


Stool for White Cells  NO WBCs SEEN  














  10/23/17





  08:27


 


WBC 


 


RBC 


 


Hgb 


 


Hct 


 


MCV 


 


MCH 


 


MCHC 


 


RDW 


 


Plt Count 


 


Sodium  144.8


 


Potassium  4.2


 


Chloride  110 H


 


Carbon Dioxide  20 L


 


Anion Gap  15


 


BUN  16


 


Creatinine  0.81


 


Est GFR ( Amer)  > 60


 


Est GFR (Non-Af Amer)  > 60


 


Glucose  181 H


 


Calcium  7.6 L


 


Stool Occult Blood 


 


Stool for White Cells 








 











  10/23/17 10/23/17





  06:59 08:27


 


CK-MB (CK-2)  Cancelled  1.14











Impressions: 


 





Soft Tissue Neck CT  10/22/17 05:11


IMPRESSION:  Mild bilateral cervical adenopathy.  Otherwise, no acute findings 

in the soft tissues of the neck on unenhanced CT.


 














Plan


Time Spent: Less than 30 Minutes

## 2017-11-01 ENCOUNTER — HOSPITAL ENCOUNTER (INPATIENT)
Dept: HOSPITAL 62 - ER | Age: 32
LOS: 4 days | Discharge: HOME | DRG: 418 | End: 2017-11-05
Attending: SURGERY | Admitting: SURGERY
Payer: COMMERCIAL

## 2017-11-01 DIAGNOSIS — E66.9: ICD-10-CM

## 2017-11-01 DIAGNOSIS — D50.9: ICD-10-CM

## 2017-11-01 DIAGNOSIS — I10: ICD-10-CM

## 2017-11-01 DIAGNOSIS — Z83.3: ICD-10-CM

## 2017-11-01 DIAGNOSIS — I16.0: ICD-10-CM

## 2017-11-01 DIAGNOSIS — Z82.49: ICD-10-CM

## 2017-11-01 DIAGNOSIS — K85.10: Primary | ICD-10-CM

## 2017-11-01 LAB
ABSOLUTE EOSINOPHILS# (MANUAL): 0 10^3/UL (ref 0–0.6)
ALBUMIN SERPL-MCNC: 4.1 G/DL (ref 3.5–5)
ALP SERPL-CCNC: 64 U/L (ref 38–126)
ALT SERPL-CCNC: 74 U/L (ref 9–52)
ANION GAP SERPL CALC-SCNC: 14 MMOL/L (ref 5–19)
ANISOCYTOSIS BLD QL SMEAR: SLIGHT
APPEARANCE UR: (no result)
AST SERPL-CCNC: 124 U/L (ref 14–36)
BASOPHILS NFR BLD MANUAL: 0 % (ref 0–2)
BILIRUB DIRECT SERPL-MCNC: 0.4 MG/DL (ref 0–0.4)
BILIRUB SERPL-MCNC: 0.4 MG/DL (ref 0.2–1.3)
BILIRUB UR QL STRIP: NEGATIVE
BUN SERPL-MCNC: 12 MG/DL (ref 7–20)
CALCIUM: 9.6 MG/DL (ref 8.4–10.2)
CHLORIDE SERPL-SCNC: 103 MMOL/L (ref 98–107)
CO2 SERPL-SCNC: 25 MMOL/L (ref 22–30)
CREAT SERPL-MCNC: 0.82 MG/DL (ref 0.52–1.25)
EOSINOPHIL NFR BLD MANUAL: 0 % (ref 0–6)
ERYTHROCYTE [DISTWIDTH] IN BLOOD BY AUTOMATED COUNT: 15.3 % (ref 11.5–14)
GLUCOSE SERPL-MCNC: 129 MG/DL (ref 75–110)
GLUCOSE UR STRIP-MCNC: NEGATIVE MG/DL
HCT VFR BLD CALC: 34.8 % (ref 36–47)
HGB BLD-MCNC: 11.4 G/DL (ref 12–15.5)
HGB HCT DIFFERENCE: -0.6
KETONES UR STRIP-MCNC: NEGATIVE MG/DL
LIPASE SERPL-CCNC: 904.8 U/L (ref 23–300)
MCH RBC QN AUTO: 25.8 PG (ref 27–33.4)
MCHC RBC AUTO-ENTMCNC: 32.9 G/DL (ref 32–36)
MCV RBC AUTO: 78 FL (ref 80–97)
MICROCYTES BLD QL SMEAR: SLIGHT
NITRITE UR QL STRIP: NEGATIVE
PH UR STRIP: 5 [PH] (ref 5–9)
POTASSIUM SERPL-SCNC: 4.7 MMOL/L (ref 3.6–5)
PROT SERPL-MCNC: 8.9 G/DL (ref 6.3–8.2)
PROT UR STRIP-MCNC: 100 MG/DL
RBC # BLD AUTO: 4.44 10^6/UL (ref 3.72–5.28)
SODIUM SERPL-SCNC: 141.6 MMOL/L (ref 137–145)
SP GR UR STRIP: 1.02
TOTAL CELLS COUNTED BLD: 100
TOXIC GRANULES BLD QL SMEAR: (no result)
UROBILINOGEN UR-MCNC: NEGATIVE MG/DL (ref ?–2)
VARIANT LYMPHS NFR BLD MANUAL: 34 % (ref 13–45)
WBC # BLD AUTO: 8 10^3/UL (ref 4–10.5)
WBC TOXIC VACUOLES BLD QL SMEAR: PRESENT

## 2017-11-01 PROCEDURE — 80048 BASIC METABOLIC PNL TOTAL CA: CPT

## 2017-11-01 PROCEDURE — 81001 URINALYSIS AUTO W/SCOPE: CPT

## 2017-11-01 PROCEDURE — 76705 ECHO EXAM OF ABDOMEN: CPT

## 2017-11-01 PROCEDURE — 99285 EMERGENCY DEPT VISIT HI MDM: CPT

## 2017-11-01 PROCEDURE — 80076 HEPATIC FUNCTION PANEL: CPT

## 2017-11-01 PROCEDURE — 84156 ASSAY OF PROTEIN URINE: CPT

## 2017-11-01 PROCEDURE — 85025 COMPLETE CBC W/AUTO DIFF WBC: CPT

## 2017-11-01 PROCEDURE — 71020: CPT

## 2017-11-01 PROCEDURE — 80053 COMPREHEN METABOLIC PANEL: CPT

## 2017-11-01 PROCEDURE — 94799 UNLISTED PULMONARY SVC/PX: CPT

## 2017-11-01 PROCEDURE — 88304 TISSUE EXAM BY PATHOLOGIST: CPT

## 2017-11-01 PROCEDURE — 82962 GLUCOSE BLOOD TEST: CPT

## 2017-11-01 PROCEDURE — 36415 COLL VENOUS BLD VENIPUNCTURE: CPT

## 2017-11-01 PROCEDURE — S0028 INJECTION, FAMOTIDINE, 20 MG: HCPCS

## 2017-11-01 PROCEDURE — 82570 ASSAY OF URINE CREATININE: CPT

## 2017-11-01 PROCEDURE — 83690 ASSAY OF LIPASE: CPT

## 2017-11-01 PROCEDURE — 74300 X-RAY BILE DUCTS/PANCREAS: CPT

## 2017-11-01 PROCEDURE — 84703 CHORIONIC GONADOTROPIN ASSAY: CPT

## 2017-11-01 NOTE — ER DOCUMENT REPORT
ED General





- General


Chief Complaint: Low Back Pain


Stated Complaint: LOWER BACK PAIN


Time Seen by Provider: 17 22:51


Mode of Arrival: Ambulatory


Information source: Patient


TRAVEL OUTSIDE OF THE [place] IN LAST 30 DAYS: No





- HPI


Patient complains to provider of: Right flank


Onset: Other - 2-3 days


Onset/Duration: Gradual


Quality of pain: Sharp


Severity: Moderate


Pain Level: 4


Exacerbated by: Movement, Coughing, Deep breathing


Relieved by: Denies


Notes: 





Patient is a 32-year-old female resenting to the emergency room complaining of 

right flank pain for the past few days, she was recently seen at this hospital 

and admitted for strep throat with acute renal failure from the hydration, 

going home she has been having pain in the right flank, she denies any nausea, 

vomiting or diarrhea, it does hurt when she moves a certain way or takes a deep 

breath, she denies any fever, 2 days ago she had some epigastric pain as well, 

patient is 2 weeks postpartum from a scheduled  delivery





- Related Data


Allergies/Adverse Reactions: 


 





No Known Allergies Allergy (Unverified 10/22/17 01:37)


 











Past Medical History





- General


Information source: Patient





- Social History


Smoking Status: Never Smoker


Chew tobacco use (# tins/day): No


Frequency of alcohol use: None


Drug Abuse: None


Family History: CAD, DM, Hypertension


Patient has suicidal ideation: No


Patient has homicidal ideation: No





- Past Medical History


Cardiac Medical History: Reports: Hx Hypertension - no meds


   Denies: Hx Coronary Artery Disease, Hx Heart Attack, Hx Heart Murmur


Pulmonary Medical History: 


   Denies: Hx Asthma, Hx Bronchitis, Hx COPD, Hx Pneumonia


Neurological Medical History: Denies: Hx Cerebrovascular Accident, Hx Seizures


Endocrine Medical History: Denies: Hx Hyperthyroidism, Hx Hypothyroidism


Renal/ Medical History: Reports: Hx Ovarian Cysts.  Denies: Hx Peritoneal 

Dialysis, Hx Pelvic Inflammatory Disease


Malignancy Medical History: Denies: Hx Breast Cancer, Hx Cervical Cancer, Hx 

Ovarian Cancer


Musculoskeltal Medical History: Denies Hx Arthritis


Past Surgical History: Reports: Hx  Section, Hx Dilation and Curettage.

  Denies: Hx Hysterectomy





- Immunizations


Hx Diphtheria, Pertussis, Tetanus Vaccination: Yes





Review of Systems





- Review of Systems


Constitutional: No symptoms reported


EENT: No symptoms reported


Cardiovascular: No symptoms reported


Respiratory: No symptoms reported


Gastrointestinal: See HPI


Genitourinary: No symptoms reported


Female Genitourinary: No symptoms reported


Musculoskeletal: No symptoms reported


Skin: No symptoms reported


Hematologic/Lymphatic: No symptoms reported


Neurological/Psychological: No symptoms reported


-: Yes All other systems reviewed and negative





Physical Exam





- Vital signs


Vitals: 


 











Temp Pulse Resp BP Pulse Ox


 


 99.5 F   84   17   135/85 H  100 


 


 17 20:40  17 20:40  17 20:40  17 20:40  17 20:40











Interpretation: Normal





- General


General appearance: Appears well, Alert





- HEENT


Head: Normocephalic, Atraumatic


Eyes: Normal


Pupils: PERRL





- Respiratory


Respiratory status: No respiratory distress


Chest status: Nontender


Breath sounds: Normal


Chest palpation: Normal





- Cardiovascular


Rhythm: Regular


Heart sounds: Normal auscultation


Murmur: No





- Abdominal


Inspection: Normal


Distension: No distension


Bowel sounds: Normal


Tenderness: Lind's sign


Organomegaly: No organomegaly





- Back


Back: Normal, Nontender





- Extremities


General upper extremity: Normal inspection, Nontender, Normal color, Normal ROM

, Normal temperature


General lower extremity: Normal inspection, Nontender, Normal color, Normal ROM

, Normal temperature, Normal weight bearing.  No: Germania's sign





- Neurological


Neuro grossly intact: Yes


Cognition: Normal


Orientation: AAOx4


Baldomero Coma Scale Eye Opening: Spontaneous


Arma Coma Scale Verbal: Oriented


Baldomero Coma Scale Motor: Obeys Commands


Baldomero Coma Scale Total: 15


Speech: Normal


Motor strength normal: LUE, RUE, LLE, RLE


Sensory: Normal





- Psychological


Associated symptoms: Normal affect, Normal mood





- Skin


Skin Temperature: Warm


Skin Moisture: Dry


Skin Color: Normal





Course





- Re-evaluation


Re-evalutation: 





17 01:03


Patient was discussed with the surgeon, Dr. Cash who agrees to admit for 

further evaluation and treatment, requests that patient be kept n.p.o. for 

likely surgery this plan was discussed with patient who is in agreement





- Vital Signs


Vital signs: 


 











Temp Pulse Resp BP Pulse Ox


 


 97.8 F   84   17   140/78 H  96 


 


 17 01:01  17 20:40  17 20:40  17 02:01  17 02:01














- Laboratory


Result Diagrams: 


 17 22:30





 17 22:30


Laboratory results interpreted by me: 


 











  17





  21:50 22:30 22:30


 


Hgb    11.4 L


 


Hct    34.8 L


 


MCV    78 L


 


MCH    25.8 L


 


RDW    15.3 H


 


Glucose   129 H 


 


AST   124 H 


 


ALT   74 H 


 


Total Protein   8.9 H 


 


Lipase   904.8 H 


 


Urine Protein  100 H  


 


Urine Blood  SMALL H  


 


Urine Ascorbic Acid  20 H  














- Diagnostic Test


Radiology reviewed: Image reviewed, Reports reviewed





Discharge





- Discharge


Clinical Impression: 


 Gallstone pancreatitis





Condition: Stable


Disposition: ADMITTED AS INPATIENT


Admitting Provider: Surgicalist


Unit Admitted: Surgical Floor

## 2017-11-02 LAB
ALBUMIN SERPL-MCNC: 3.4 G/DL (ref 3.5–5)
ALP SERPL-CCNC: 53 U/L (ref 38–126)
ALT SERPL-CCNC: 66 U/L (ref 9–52)
ANION GAP SERPL CALC-SCNC: 13 MMOL/L (ref 5–19)
AST SERPL-CCNC: 89 U/L (ref 14–36)
BASOPHILS # BLD AUTO: 0 10^3/UL (ref 0–0.2)
BASOPHILS NFR BLD AUTO: 0.6 % (ref 0–2)
BILIRUB DIRECT SERPL-MCNC: 0.3 MG/DL (ref 0–0.4)
BILIRUB SERPL-MCNC: 0.3 MG/DL (ref 0.2–1.3)
BUN SERPL-MCNC: 11 MG/DL (ref 7–20)
CALCIUM: 8.4 MG/DL (ref 8.4–10.2)
CHLORIDE SERPL-SCNC: 108 MMOL/L (ref 98–107)
CO2 SERPL-SCNC: 21 MMOL/L (ref 22–30)
CREAT SERPL-MCNC: 0.65 MG/DL (ref 0.52–1.25)
EOSINOPHIL # BLD AUTO: 0 10^3/UL (ref 0–0.6)
EOSINOPHIL NFR BLD AUTO: 0.2 % (ref 0–6)
ERYTHROCYTE [DISTWIDTH] IN BLOOD BY AUTOMATED COUNT: 15.3 % (ref 11.5–14)
GLUCOSE SERPL-MCNC: 140 MG/DL (ref 75–110)
HCT VFR BLD CALC: 31.2 % (ref 36–47)
HGB BLD-MCNC: 10.1 G/DL (ref 12–15.5)
HGB HCT DIFFERENCE: -0.9
LIPASE SERPL-CCNC: 472.5 U/L (ref 23–300)
LYMPHOCYTES # BLD AUTO: 1.6 10^3/UL (ref 0.5–4.7)
LYMPHOCYTES NFR BLD AUTO: 34.8 % (ref 13–45)
MCH RBC QN AUTO: 25.3 PG (ref 27–33.4)
MCHC RBC AUTO-ENTMCNC: 32.3 G/DL (ref 32–36)
MCV RBC AUTO: 78 FL (ref 80–97)
MONOCYTES # BLD AUTO: 0.3 10^3/UL (ref 0.1–1.4)
MONOCYTES NFR BLD AUTO: 6.2 % (ref 3–13)
NEUTROPHILS # BLD AUTO: 2.7 10^3/UL (ref 1.7–8.2)
NEUTS SEG NFR BLD AUTO: 58.2 % (ref 42–78)
POTASSIUM SERPL-SCNC: 3.6 MMOL/L (ref 3.6–5)
PROT SERPL-MCNC: 7.6 G/DL (ref 6.3–8.2)
RBC # BLD AUTO: 3.98 10^6/UL (ref 3.72–5.28)
SODIUM SERPL-SCNC: 141.5 MMOL/L (ref 137–145)
WBC # BLD AUTO: 4.7 10^3/UL (ref 4–10.5)

## 2017-11-02 RX ADMIN — HYDROMORPHONE HYDROCHLORIDE PRN MG: 2 INJECTION INTRAMUSCULAR; INTRAVENOUS; SUBCUTANEOUS at 19:52

## 2017-11-02 RX ADMIN — DEXAMETHASONE SODIUM PHOSPHATE PRN MG: 10 INJECTION INTRAMUSCULAR; INTRAVENOUS at 19:53

## 2017-11-02 RX ADMIN — DEXAMETHASONE SODIUM PHOSPHATE PRN MG: 10 INJECTION INTRAMUSCULAR; INTRAVENOUS at 09:02

## 2017-11-02 RX ADMIN — HYDROMORPHONE HYDROCHLORIDE PRN MG: 2 INJECTION INTRAMUSCULAR; INTRAVENOUS; SUBCUTANEOUS at 09:02

## 2017-11-02 RX ADMIN — SODIUM CHLORIDE PRN ML: 9 INJECTION, SOLUTION INTRAVENOUS at 08:54

## 2017-11-02 RX ADMIN — HYDROMORPHONE HYDROCHLORIDE PRN MG: 2 INJECTION INTRAMUSCULAR; INTRAVENOUS; SUBCUTANEOUS at 17:03

## 2017-11-02 RX ADMIN — SODIUM CHLORIDE PRN ML: 9 INJECTION, SOLUTION INTRAVENOUS at 17:04

## 2017-11-02 RX ADMIN — HYDROMORPHONE HYDROCHLORIDE PRN MG: 2 INJECTION INTRAMUSCULAR; INTRAVENOUS; SUBCUTANEOUS at 02:57

## 2017-11-02 RX ADMIN — SODIUM CHLORIDE PRN ML: 9 INJECTION, SOLUTION INTRAVENOUS at 02:57

## 2017-11-02 NOTE — RADIOLOGY REPORT (SQ)
EXAM DESCRIPTION:  U/S ABDOMEN LIMITED W/O DOP



COMPLETED DATE/TIME:  11/2/2017 12:16 am



REASON FOR STUDY:  abd pain



COMPARISON:  None.



TECHNIQUE:  Dynamic and static grayscale images acquired of the abdomen and recorded on PACS. Additio
nal selected color Doppler and spectral images recorded.



LIMITATIONS:  None.



FINDINGS:  PANCREAS: No masses.  Visualized pancreatic duct normal caliber.

LIVER: 21.5 cm.  Fatty infiltration.  No focal masses.

LIVER VASCULATURE: Normal directional flow of the main portal vein and hepatic veins.

GALLBLADDER: Neck in multiple stones.  No wall thickening.

ULTRASOUND-DETECTED MONTALVO'S SIGN: Negative.

INTRAHEPATIC DUCTS AND COMMON DUCT: CBD and intrahepatic ducts normal caliber. No filling defects.

INFERIOR VENA CAVA: Normal flow.

AORTA: No aneurysm.

RIGHT KIDNEY:  Normal size. Normal echogenicity. No solid or suspicious masses. No hydronephrosis. No
 calcifications.

PERITONEAL AND RIGHT PLEURAL SPACE: No ascites or effusions.

OTHER: No other significant findings.



IMPRESSION:  Hepatic steatosis.  Hepatomegaly.

Gallstones.



TECHNICAL DOCUMENTATION:  JOB ID:  [id number]

 [employer]- All Rights Reserved

## 2017-11-02 NOTE — PDOC PROGRESS REPORT
Subjective


Progress Note for:: 11/02/17


Subjective:: 





Abdominal pain improved but still present.  3 out of 5 in severity.





Physical Exam


Vital Signs: 


 











Temp Pulse Resp BP Pulse Ox


 


 98.5 F   84   16   141/86 H  100 


 


 11/02/17 06:01  11/02/17 06:01  11/02/17 06:01  11/02/17 06:01  11/02/17 06:01








 Intake & Output











 11/01/17 11/02/17 11/03/17





 06:59 06:59 06:59


 


Intake Total  600 


 


Output Total  200 


 


Balance  400 


 


Weight  116.7 kg 











General appearance: PRESENT: no acute distress, cooperative


Respiratory exam: PRESENT: clear to auscultation aster


Cardiovascular exam: PRESENT: RRR


GI/Abdominal exam: PRESENT: other - Soft, mildly distended, tenderness across 

the epigastric region without peritoneal signs.


Extremities exam: PRESENT: other - No swelling





Results


Laboratory Results: 


 





 11/02/17 05:12 





 11/02/17 05:12 





 











  11/02/17 11/02/17





  05:12 05:12


 


WBC  4.7 


 


RBC  3.98 


 


Hgb  10.1 L 


 


Hct  31.2 L 


 


MCV  78 L 


 


MCH  25.3 L 


 


MCHC  32.3 


 


RDW  15.3 H 


 


Plt Count  281 


 


Seg Neutrophils %  58.2 


 


Lymphocytes %  34.8 


 


Monocytes %  6.2 


 


Eosinophils %  0.2 


 


Basophils %  0.6 


 


Absolute Neutrophils  2.7 


 


Absolute Lymphocytes  1.6 


 


Absolute Monocytes  0.3 


 


Absolute Eosinophils  0.0 


 


Absolute Basophils  0.0 


 


Sodium   141.5


 


Potassium   3.6  D


 


Chloride   108 H


 


Carbon Dioxide   21 L


 


Anion Gap   13


 


BUN   11


 


Creatinine   0.65


 


Est GFR ( Amer)   > 60


 


Est GFR (Non-Af Amer)   > 60


 


Glucose   140 H


 


Calcium   8.4


 


Total Bilirubin   0.3


 


AST   89 H


 


ALT   66 H


 


Alkaline Phosphatase   53


 


Total Protein   7.6


 


Albumin   3.4 L


 


Lipase   472.5 H











Impressions: 


 





Abdomen Ultrasound  11/01/17 23:04


IMPRESSION:  Hepatic steatosis.  Hepatomegaly.


Gallstones.


 














Assessment & Plan





- Diagnosis


(1) Gallstone pancreatitis


Is this a current diagnosis for this admission?: Yes   


Plan: 


Still with evidence of pancreatitis with abdominal pain and elevated lipase.  

Will await pancreatitis to settle down for a safe laparoscopic cholecystectomy.

  Encourage ambulation

## 2017-11-03 LAB
ALBUMIN SERPL-MCNC: 3.2 G/DL (ref 3.5–5)
ALP SERPL-CCNC: 53 U/L (ref 38–126)
ALT SERPL-CCNC: 60 U/L (ref 9–52)
ANION GAP SERPL CALC-SCNC: 11 MMOL/L (ref 5–19)
AST SERPL-CCNC: 62 U/L (ref 14–36)
BASOPHILS # BLD AUTO: 0 10^3/UL (ref 0–0.2)
BASOPHILS NFR BLD AUTO: 1.1 % (ref 0–2)
BILIRUB DIRECT SERPL-MCNC: 0.3 MG/DL (ref 0–0.4)
BILIRUB SERPL-MCNC: 0.4 MG/DL (ref 0.2–1.3)
BUN SERPL-MCNC: 8 MG/DL (ref 7–20)
CALCIUM: 8.8 MG/DL (ref 8.4–10.2)
CHLORIDE SERPL-SCNC: 108 MMOL/L (ref 98–107)
CO2 SERPL-SCNC: 21 MMOL/L (ref 22–30)
CREAT SERPL-MCNC: 0.71 MG/DL (ref 0.52–1.25)
EOSINOPHIL # BLD AUTO: 0 10^3/UL (ref 0–0.6)
EOSINOPHIL NFR BLD AUTO: 0.4 % (ref 0–6)
ERYTHROCYTE [DISTWIDTH] IN BLOOD BY AUTOMATED COUNT: 15.2 % (ref 11.5–14)
GLUCOSE SERPL-MCNC: 87 MG/DL (ref 75–110)
HCT VFR BLD CALC: 31.4 % (ref 36–47)
HGB BLD-MCNC: 10.3 G/DL (ref 12–15.5)
HGB HCT DIFFERENCE: -0.5
LIPASE SERPL-CCNC: 491.3 U/L (ref 23–300)
LYMPHOCYTES # BLD AUTO: 2 10^3/UL (ref 0.5–4.7)
LYMPHOCYTES NFR BLD AUTO: 47.1 % (ref 13–45)
MCH RBC QN AUTO: 25.6 PG (ref 27–33.4)
MCHC RBC AUTO-ENTMCNC: 32.9 G/DL (ref 32–36)
MCV RBC AUTO: 78 FL (ref 80–97)
MONOCYTES # BLD AUTO: 0.3 10^3/UL (ref 0.1–1.4)
MONOCYTES NFR BLD AUTO: 6 % (ref 3–13)
NEUTROPHILS # BLD AUTO: 1.9 10^3/UL (ref 1.7–8.2)
NEUTS SEG NFR BLD AUTO: 45.4 % (ref 42–78)
POTASSIUM SERPL-SCNC: 4.4 MMOL/L (ref 3.6–5)
PROT SERPL-MCNC: 7.4 G/DL (ref 6.3–8.2)
RBC # BLD AUTO: 4.04 10^6/UL (ref 3.72–5.28)
SODIUM SERPL-SCNC: 139.8 MMOL/L (ref 137–145)
WBC # BLD AUTO: 4.3 10^3/UL (ref 4–10.5)

## 2017-11-03 PROCEDURE — BF131ZZ FLUOROSCOPY OF GALLBLADDER AND BILE DUCTS USING LOW OSMOLAR CONTRAST: ICD-10-PCS | Performed by: SURGERY

## 2017-11-03 PROCEDURE — 0FT44ZZ RESECTION OF GALLBLADDER, PERCUTANEOUS ENDOSCOPIC APPROACH: ICD-10-PCS | Performed by: SURGERY

## 2017-11-03 RX ADMIN — HYDROMORPHONE HYDROCHLORIDE PRN MG: 2 INJECTION INTRAMUSCULAR; INTRAVENOUS; SUBCUTANEOUS at 14:52

## 2017-11-03 RX ADMIN — DEXAMETHASONE SODIUM PHOSPHATE PRN MG: 10 INJECTION INTRAMUSCULAR; INTRAVENOUS at 14:52

## 2017-11-03 RX ADMIN — HYDROMORPHONE HYDROCHLORIDE PRN MG: 2 INJECTION INTRAMUSCULAR; INTRAVENOUS; SUBCUTANEOUS at 19:06

## 2017-11-03 RX ADMIN — SODIUM CHLORIDE PRN ML: 9 INJECTION, SOLUTION INTRAVENOUS at 02:11

## 2017-11-03 RX ADMIN — CEFAZOLIN SODIUM SCH ML: 1 SOLUTION INTRAVENOUS at 17:29

## 2017-11-03 RX ADMIN — HYDROMORPHONE HYDROCHLORIDE PRN MG: 2 INJECTION INTRAMUSCULAR; INTRAVENOUS; SUBCUTANEOUS at 04:58

## 2017-11-03 RX ADMIN — OXYCODONE AND ACETAMINOPHEN PRN TAB: 5; 325 TABLET ORAL at 17:26

## 2017-11-03 NOTE — OPERATIVE REPORT E
Operative Report



NAME: JACQUELINE GARCIA

MRN:  [id number]          : 1985 AGE:  32Y

DATE OF SURGERY: 2017              ROOM: 307



PREOPERATIVE DIAGNOSES:

1.  CHOLELITHIASIS.

2.  GALLSTONE PANCREATITIS.



POSTOPERATIVE DIAGNOSES:

1.  CHOLELITHIASIS.

2.  GALLSTONE PANCREATITIS.



OPERATION:

Laparoscopic cholecystectomy and intraoperative cholangiogram.



SURGEON:

JONES SOLANO M.D.



ANESTHESIA:

General



INDICATIONS:  This is a 32-year-old female admitted for about 5-day

duration of mid back pains and epigastric pains and right lower quadrant

pains.  Ultrasound showed gallstones and laboratory showed elevated lipase

to 900.  LFTs slightly elevated.  Yesterday her enzymes came down.  LFTs

almost down to normal and then lipase came down to around 400.  Also this

morning, the day of surgery, patient essentially with just minimal back

pains, no abdominal tenderness.



DESCRIPTION OF PROCEDURE:

After adequate general anesthesia, the patient was placed in supine

position and her abdomen prepped and draped in the usual sterile fashion. 

An infraumbilical elliptical incision was made and the fascia subsequently

identified and grasped with Kocher clamps and divided within the Kocher

clamps.  A Johanna trocar was then inserted into the abdominal cavity and

CO2 insufflated up to a pressure of 15 mmHg.  The camera was then inserted

and three other trocars were placed under direct vision, a 12 mm in the

subxiphoid and two 5 mm trocars in the right upper quadrant.  The

gallbladder was then identified and noted to be free of adhesions.  The

wall may be just a little bit thickened.  The cystic duct was then

dissected and noted to be quite small.  The cystic artery was then

identified and clipped with hemoclips and divided with the use of Harmonic

jonnathan.  The junction of the cystic duct and the gallbladder was then

clipped and then opening made in the cystic duct.  A cholangiogram was

then performed through a catheter in the cystic duct, and a cholangiogram

unofficially showed essentially normal findings.  Next, the cystic duct

was then clipped with hemoclips and divided within the hemoclips.  The

gallbladder was then dissected off the liver bed with the use of Harmonic

jonnathan.  Liver bed hemostasis further obtained with the use of hook

cautery.  The gallbladder was then placed in the Endobag and pulled out

through the subxiphoid port.  The gallbladder had at least one palpable

stone about 6-mm in diameter.  The gallbladder bed was then checked and

irrigated and no evidence of oozing noted.



Next, all the trocars were removed and CO2 allowed to come out all the

trocar sites.  The fascial defect of the umbilical area was then closed

with a figure-of-eight suture using 0 Vicryl.  There might be a little

umbilical hernia since a piece of omentum appeared to be plastered at this

area.  Omentum was not taken down.  A single suture of 0 Vicryl was then

placed at the subxiphoid fascial defect site.  Next, all the fascial

defects were injected with Marcaine with epinephrine and the skin

incisions also injected.  Next, all the skin incisions were then closed

with running subcuticular using 4-0 Vicryl undyed.  Sterile Dermabond

dressings were used over the incision sites.  Patient tolerated the

procedure well.  Needle, instrument, and sponge counts were all correct

and estimated blood loss about 20 mL.  Patient then brought to the

recovery room in satisfactory condition.





DICTATING PHYSICIAN:  JONES SOLANO M.D.





[id number]                  DT: 2017    1250

PHY#: [id number]            DD: 2017

ID:   [id number]           JOB#: [id number]       ACCT: [id number]



cc:JONES SOLANO M.D.

>

## 2017-11-03 NOTE — RADIOLOGY REPORT (SQ)
EXAM DESCRIPTION:  CHOLANGIOGRAM OPERATIVE



COMPLETED DATE/TIME:  11/3/2017 2:59 pm



REASON FOR STUDY:  CHOLANGIOGRAM IN OR



COMPARISON:  Abdominal ultrasound 11/1/2017



FLUOROSCOPY TIME:  1.3 minutes

10 series of digital images saved to PACS.



TECHNIQUE:  Cinegraphic images were obtained from an intraoperative cholangiogram.



LIMITATIONS:  None.



FINDINGS:  There is opacification of the bile ducts, cystic duct remnants and second portion of the d
uodenum without evidence of fixed filling defect or significant extravasation.



IMPRESSION:  INTRAOPERATIVE CHOLANGIOGRAM.



COMMENT:  Quality :  Final reports for procedures using fluoroscopy that document radiation exp
osure indices, or exposure time and number of fluorographic images (if radiation exposure indices are
 not available)



TECHNICAL DOCUMENTATION:  JOB ID:  [id number]

 2011 [employer]- All Rights Reserved

## 2017-11-04 LAB
ALBUMIN SERPL-MCNC: 3.3 G/DL (ref 3.5–5)
ALP SERPL-CCNC: 56 U/L (ref 38–126)
ALT SERPL-CCNC: 68 U/L (ref 9–52)
ANION GAP SERPL CALC-SCNC: 10 MMOL/L (ref 5–19)
APPEARANCE UR: CLEAR
AST SERPL-CCNC: 62 U/L (ref 14–36)
BASOPHILS # BLD AUTO: 0 10^3/UL (ref 0–0.2)
BASOPHILS NFR BLD AUTO: 0.2 % (ref 0–2)
BILIRUB DIRECT SERPL-MCNC: 0.4 MG/DL (ref 0–0.4)
BILIRUB SERPL-MCNC: 0.4 MG/DL (ref 0.2–1.3)
BILIRUB UR QL STRIP: NEGATIVE
BUN SERPL-MCNC: 6 MG/DL (ref 7–20)
CALCIUM: 8.9 MG/DL (ref 8.4–10.2)
CHLORIDE SERPL-SCNC: 107 MMOL/L (ref 98–107)
CO2 SERPL-SCNC: 22 MMOL/L (ref 22–30)
CREAT SERPL-MCNC: 0.66 MG/DL (ref 0.52–1.25)
CREAT UR-MCNC: 41.7 MG/DL (ref 16–327)
EOSINOPHIL # BLD AUTO: 0 10^3/UL (ref 0–0.6)
EOSINOPHIL NFR BLD AUTO: 0.1 % (ref 0–6)
ERYTHROCYTE [DISTWIDTH] IN BLOOD BY AUTOMATED COUNT: 15.3 % (ref 11.5–14)
GLUCOSE SERPL-MCNC: 107 MG/DL (ref 75–110)
GLUCOSE UR STRIP-MCNC: NEGATIVE MG/DL
HCT VFR BLD CALC: 30.6 % (ref 36–47)
HGB BLD-MCNC: 10 G/DL (ref 12–15.5)
HGB HCT DIFFERENCE: -0.6
KETONES UR STRIP-MCNC: NEGATIVE MG/DL
LIPASE SERPL-CCNC: 269.8 U/L (ref 23–300)
LYMPHOCYTES # BLD AUTO: 2.3 10^3/UL (ref 0.5–4.7)
LYMPHOCYTES NFR BLD AUTO: 30 % (ref 13–45)
MCH RBC QN AUTO: 25.5 PG (ref 27–33.4)
MCHC RBC AUTO-ENTMCNC: 32.7 G/DL (ref 32–36)
MCV RBC AUTO: 78 FL (ref 80–97)
MONOCYTES # BLD AUTO: 0.5 10^3/UL (ref 0.1–1.4)
MONOCYTES NFR BLD AUTO: 6.4 % (ref 3–13)
NEUTROPHILS # BLD AUTO: 4.9 10^3/UL (ref 1.7–8.2)
NEUTS SEG NFR BLD AUTO: 63.3 % (ref 42–78)
NITRITE UR QL STRIP: NEGATIVE
PH UR STRIP: 7 [PH] (ref 5–9)
POTASSIUM SERPL-SCNC: 4 MMOL/L (ref 3.6–5)
PROT SERPL-MCNC: 7.6 G/DL (ref 6.3–8.2)
PROT UR STRIP-MCNC: 22.3 MG/DL (ref ?–12)
PROT UR STRIP-MCNC: NEGATIVE MG/DL
RBC # BLD AUTO: 3.92 10^6/UL (ref 3.72–5.28)
SODIUM SERPL-SCNC: 139.3 MMOL/L (ref 137–145)
SP GR UR STRIP: 1
UROBILINOGEN UR-MCNC: NEGATIVE MG/DL (ref ?–2)
WBC # BLD AUTO: 7.8 10^3/UL (ref 4–10.5)

## 2017-11-04 RX ADMIN — SODIUM CHLORIDE PRN ML: 9 INJECTION, SOLUTION INTRAVENOUS at 22:52

## 2017-11-04 RX ADMIN — NIFEDIPINE SCH MG: 30 TABLET, FILM COATED, EXTENDED RELEASE ORAL at 22:47

## 2017-11-04 RX ADMIN — SODIUM CHLORIDE PRN ML: 9 INJECTION, SOLUTION INTRAVENOUS at 08:05

## 2017-11-04 RX ADMIN — CEFAZOLIN SODIUM SCH ML: 1 SOLUTION INTRAVENOUS at 02:00

## 2017-11-04 RX ADMIN — OXYCODONE AND ACETAMINOPHEN PRN TAB: 5; 325 TABLET ORAL at 22:50

## 2017-11-04 RX ADMIN — OXYCODONE AND ACETAMINOPHEN PRN TAB: 5; 325 TABLET ORAL at 00:07

## 2017-11-04 RX ADMIN — OXYCODONE AND ACETAMINOPHEN PRN TAB: 5; 325 TABLET ORAL at 13:35

## 2017-11-04 RX ADMIN — OXYCODONE AND ACETAMINOPHEN PRN TAB: 5; 325 TABLET ORAL at 08:04

## 2017-11-05 VITALS — SYSTOLIC BLOOD PRESSURE: 157 MMHG | DIASTOLIC BLOOD PRESSURE: 91 MMHG

## 2017-11-05 RX ADMIN — OXYCODONE AND ACETAMINOPHEN PRN TAB: 5; 325 TABLET ORAL at 12:36

## 2017-11-05 RX ADMIN — NIFEDIPINE SCH MG: 30 TABLET, FILM COATED, EXTENDED RELEASE ORAL at 09:09

## 2017-11-05 NOTE — RADIOLOGY REPORT (SQ)
EXAM DESCRIPTION:  CHEST PA/LAT



COMPLETED DATE/TIME:  11/5/2017 8:48 am



REASON FOR STUDY:  fever



COMPARISON:  Chest films 10/16/2008, 6/27/2008



EXAM PARAMETERS:  NUMBER OF VIEWS: two views

TECHNIQUE: Digital Frontal and Lateral radiographic views of the chest acquired.

RADIATION DOSE: NA

LIMITATIONS: none



FINDINGS:  LUNGS AND PLEURA: Minimal left retrocardiac airspace disease atelectasis versus pneumonia.


Right lung clear.

No pleural effusions.  No pneumothorax.  No pulmonary nodules.

MEDIASTINUM AND HILAR STRUCTURES: No masses or contour abnormalities.

HEART AND VASCULAR STRUCTURES: Heart normal size.  No evidence for failure.

BONES: No acute findings.

HARDWARE: Clips right upper quadrant post cholecystectomy

OTHER: No other significant finding.



IMPRESSION:  Patchy left basilar airspace disease atelectasis versus pneumonia



TECHNICAL DOCUMENTATION:  JOB ID:  [id number]

 2011 [employer]- All Rights Reserved

## 2017-11-05 NOTE — PDOC CONSULTATION
Consultation


Consult Date: 17


Attending physician:: JONES SOLANO


Consult reason:: HTN





History of Present Illness


Admission Date/PCP: 


  17 02:01





  DANO JONAS





History of Present Illness: 


JACQUELINE GARCIA is a 32 year old female who is part of having had a  

approximately 2 months ago who presented to the hospital with complaints of 

abdominal pain and was found to have cholecystitis.  Patient is undergone a 

cholecystectomy.  This is been relatively uneventful, but patient does have 

significant elevation of her blood pressure.  She reports that she has had 

elevated blood pressure in the past, but was not on medication for this and did 

not take anything during her pregnancy.  She does report a history of 

gestational diabetes.  She reports she is passing flatus but is yet to have a 

bowel movement.





Past Medical History


Cardiac Medical History: Reports: Hypertension - no meds


   Denies: Coronary Artery Disease, Myocardial Infarction, Heart Murmur


Pulmonary Medical History: 


   Denies: Asthma, Bronchitis, Chronic Obstructive Pulmonary Disease (COPD), 

Pneumonia


Neurological Medical History: 


   Denies: Seizures


Endocrine Medical History: Reports: Gestational Diabetes, Obesity


   Denies: Hyperthyroidism, Hypothyroidism


Malignancy Medical History: 


   Denies: Breast Cancer, Cervical Cancer, Ovarian Cancer


Musculoskeltal Medical History: 


   Denies: Arthritis


Hematology: Reports: Anemia





Past Surgical History


Past Surgical History: Reports:  Section, Cholecystectomy


   Denies: Hysterectomy





Social History


Smoking Status: Never Smoker


Frequency of Alcohol Use: Social


Hx Recreational Drug Use: No


Hx Prescription Drug Abuse: No





- Advance Directive


Resuscitation Status: Full Code


Surrogate healthcare decision maker:: 





MotherCherie





Family History


Family History: CAD, DM, Hypertension


Parental Family History Reviewed: Yes


Children Family History Reviewed: Yes


Sibling(s) Family History Reviewed.: Yes





Medication/Allergy


Home Medications: 








No Home Medications  17 








Allergies/Adverse Reactions: 


 





No Known Allergies Allergy (Unverified 10/22/17 01:37)


 











Review of Systems


Constitutional: ABSENT: chills, fever(s), headache(s), weight gain, weight loss


Eyes: ABSENT: visual disturbances


Ears: ABSENT: hearing changes


Cardiovascular: ABSENT: chest pain, dyspnea on exertion, edema, orthropnea, 

palpitations


Respiratory: ABSENT: cough, hemoptysis


Gastrointestinal: PRESENT: abdominal pain, bloating.  ABSENT: constipation, 

diarrhea, hematemesis, hematochezia, nausea, vomiting


Genitourinary: ABSENT: dysuria, hematuria


Musculoskeletal: ABSENT: joint swelling


Integumentary: ABSENT: rash, wounds


Neurological: ABSENT: abnormal gait, abnormal speech, confusion, dizziness, 

focal weakness, syncope


Psychiatric: ABSENT: anxiety, depression, homidical ideation, suicidal ideation


Endocrine: ABSENT: cold intolerance, heat intolerance, polydipsia, polyuria


Hematologic/Lymphatic: ABSENT: easy bleeding, easy bruising





Physical Exam


Vital Signs: 


 











Temp Pulse Resp BP Pulse Ox


 


 98.7 F   72   18   160/92 H  100 


 


 17 15:42  17 19:00  17 15:42  17 15:42  17 15:42








 Intake & Output











 17





 06:59 06:59 05:59


 


Intake Total 5033 5020 2218


 


Output Total 0 820 1000


 


Balance 5033 4200 1218


 


Weight 118.6 kg 115.6 kg 











General appearance: PRESENT: no acute distress, morbidly obese, well-developed, 

well-nourished


Head exam: PRESENT: atraumatic, normocephalic


Eye exam: PRESENT: conjunctiva pink, EOMI, PERRLA.  ABSENT: scleral icterus


Ear exam: PRESENT: normal external ear exam


Mouth exam: PRESENT: moist, tongue midline


Neck exam: ABSENT: JVD, lymphadenopathy, thyromegaly, tracheal deviation


Respiratory exam: PRESENT: clear to auscultation aster.  ABSENT: rales, rhonchi, 

wheezes


Cardiovascular exam: PRESENT: RRR.  ABSENT: diastolic murmur, rubs, systolic 

murmur


Pulses: PRESENT: normal dorsalis pedis pul


Vascular exam: PRESENT: normal capillary refill


GI/Abdominal exam: PRESENT: normal bowel sounds, soft.  ABSENT: distended, 

guarding, mass, organolmegaly, rebound, tenderness


Rectal exam: PRESENT: deferred


Extremities exam: PRESENT: full ROM, +1 edema.  ABSENT: calf tenderness, 

clubbing


Neurological exam: PRESENT: alert, awake, oriented to person, oriented to place

, oriented to time, oriented to situation, CN II-XII grossly intact.  ABSENT: 

motor sensory deficit


Psychiatric exam: PRESENT: appropriate affect, normal mood.  ABSENT: homicidal 

ideation, suicidal ideation


Skin exam: PRESENT: dry, intact, warm.  ABSENT: cyanosis, rash





Results


Laboratory Results: 


 





 17 04:35 





 17 04:35 





 











  17





  04:35 04:35


 


WBC  7.8 


 


RBC  3.92 


 


Hgb  10.0 L 


 


Hct  30.6 L 


 


MCV  78 L 


 


MCH  25.5 L 


 


MCHC  32.7 


 


RDW  15.3 H 


 


Plt Count  262 


 


Seg Neutrophils %  63.3 


 


Lymphocytes %  30.0 


 


Monocytes %  6.4 


 


Eosinophils %  0.1 


 


Basophils %  0.2 


 


Absolute Neutrophils  4.9 


 


Absolute Lymphocytes  2.3 


 


Absolute Monocytes  0.5 


 


Absolute Eosinophils  0.0 


 


Absolute Basophils  0.0 


 


Sodium   139.3


 


Potassium   4.0


 


Chloride   107


 


Carbon Dioxide   22


 


Anion Gap   10


 


BUN   6 L


 


Creatinine   0.66


 


Est GFR ( Amer)   > 60


 


Est GFR (Non-Af Amer)   > 60


 


Glucose   107


 


Calcium   8.9


 


Total Bilirubin   0.4


 


AST   62 H


 


ALT   68 H


 


Alkaline Phosphatase   56


 


Total Protein   7.6


 


Albumin   3.3 L


 


Lipase   269.8











Impressions: 


 





Abdomen Ultrasound  17 23:04


IMPRESSION:  Hepatic steatosis.  Hepatomegaly.


Gallstones.


 








Cholangiogram  17 00:00


IMPRESSION:  INTRAOPERATIVE CHOLANGIOGRAM.


 














Assessment & Plan





- Diagnosis


(1) Hypertensive urgency


Is this a current diagnosis for this admission?: Yes   


Plan: 


Will initiate patient on nifedipine and hydrochlorothiazide.  She reports she 

is using Depo-Provera for birth control and considering a Mirena.  Patient is 

encouraged to eat a 2000 mg sodium restricted diet and to get a moderate amount 

of exercise when physically able.








(2) Gallstone pancreatitis


Is this a current diagnosis for this admission?: Yes   


Plan: 


Defer treatment of this to the primary team.  Patient is tolerating clear 

liquids.  Defer any diagnoses/complications relating to her cholecystectomy to 

the primary team.








(3) Anemia


Qualifiers: 


   Anemia type: iron deficiency   Iron deficiency anemia type: unspecified iron 

deficiency   Qualified Code(s): D50.9 - Iron deficiency anemia, unspecified   


Is this a current diagnosis for this admission?: Yes   





(4) Morbid obesity with BMI of 45.0-49.9, adult


Is this a current diagnosis for this admission?: Yes   


Plan: 


Encourage weight loss








(5) GDM, class A1


Is this a current diagnosis for this admission?: Yes   


Plan: 


Have encouraged patient to avoid concentrated sweets and to follow-up on this 

as an outpatient.








- Time


Time Spent: 30 to 50 Minutes


Medications reviewed and adjusted accordingly: Yes


Anticipated discharge: Home

## 2017-12-08 NOTE — DISCHARGE SUMMARY E
Discharge Summary



NAME: JACQUELINE GARCIA

MRN:  [id number]        : 1985     AGE: 32Y

ADMITTED: 2017                  DISCHARGED: 2017



PROCEDURE DONE:

Laparoscopic cholecystectomy with intraoperative cholangiogram.



FINAL DIAGNOSES:

1.  Gallstone pancreatitis.

2.  Elevated LFTs.

3.  Cholelithiasis.



HOSPITAL COURSE:

Patient admitted to the emergency room early morning of 2017 for

abdominal pains with elevated lipase and LFTs.  On 2017, lipase came

down to almost normal at 400 and LFTs also came down.  Patient also with

minimal back pains and no abdominal pains.  Patient underwent laparoscopic

cholecystectomy and intraoperative cholangiogram on 2017. 

Cholangiogram showed no stones in the common duct.  Postoperatively,

patient did well and discharge improved on 2017 with the above final

diagnosis.  Patient to be followed in the surgical clinic in 2 weeks. 

Patient advised not to do any lifting more than 15 pounds for the next

week.  Patient can have a regular diet.





DICTATING PHYSICIAN:  JONES SOLANO M.D.





[id number]                  DT: 2017    0932

PHY#: [id number]            DD: 2017

ID:   [id number]           JOB#: [id number]       ACCT: [id number]



cc:JONES SOLANO M.D.

>

## 2017-12-08 NOTE — PDOC H&P
History of Present Illness


Admission Date/PCP: 


  17 02:01





  DANO JONAS





Patient complains of: Abdominal and back pains


History of Present Illness: 





This is a 32-year-old female 2 weeks postpartum noted to have abdominal pains 

and back pains for the past 2-3 days.  She went to the emergency room 

ultrasound of the gallbladder showed gallstones.  LFTs were slightly elevated 

and lipase elevated to 900.  She was then admitted for cholelithiasis and 

gallstone pancreatitis and for possible laparoscopic cholecystectomy with 

cholangiogram





Past Medical History


Cardiac Medical History: Reports: Hypertension - no meds


   Denies: Coronary Artery Disease, Myocardial Infarction, Heart Murmur


Pulmonary Medical History: 


   Denies: Asthma, Bronchitis, Chronic Obstructive Pulmonary Disease (COPD), 

Pneumonia


Neurological Medical History: 


   Denies: Seizures


Endocrine Medical History: Reports: Gestational Diabetes, Obesity


   Denies: Hyperthyroidism, Hypothyroidism


Malignancy Medical History: 


   Denies: Breast Cancer, Cervical Cancer, Ovarian Cancer


Musculoskeltal Medical History: 


   Denies: Arthritis


Hematology: Reports: Anemia





Past Surgical History


Past Surgical History: Reports:  Section, Cholecystectomy


   Denies: Hysterectomy





Social History


Smoking Status: Never Smoker


Frequency of Alcohol Use: Social


Hx Recreational Drug Use: No


Hx Prescription Drug Abuse: No





- Advance Directive


Resuscitation Status: Full Code





Family History


Family History: CAD, DM, Hypertension


Parental Family History Reviewed: Yes


Children Family History Reviewed: No


Sibling(s) Family History Reviewed.: No





Medication/Allergy


Home Medications: 








No Home Medications  17 








Allergies/Adverse Reactions: 


 





No Known Allergies Allergy (Unverified 10/22/17 01:37)


 











Review of Systems


Constitutional: PRESENT: other - Denies any fever or weight loss.  Denies any 

visual or hearing changes.  Denies any sore throat.  Denies any chest pains no 

shortness of breath.


Gastrointestinal: PRESENT: as per HPI, abdominal pain, nausea


Genitourinary: PRESENT: other - No dysuria


Musculoskeletal: PRESENT: other - No muscle weakness


Integumentary: PRESENT: other - No skin rash


Neurological: PRESENT: other - No seizures


Psychiatric: PRESENT: other - No hallucinations


Endocrine: PRESENT: other - No polyuria polyuria no polydipsia


Hematologic/Lymphatic: PRESENT: other - No easy bruisability or lymphadenopathy





Physical Exam


Vital Signs: 


 











Temp Pulse Resp BP Pulse Ox


 


 99.2 F   91   18   157/91 H  100 


 


 17 13:47  17 13:47  17 13:47  17 13:47  17 13:47











General appearance: PRESENT: mild distress


Head exam: PRESENT: atraumatic, normocephalic


Eye exam: PRESENT: conjunctiva pink


Ear exam: PRESENT: normal external ear exam


Mouth exam: PRESENT: moist, tongue midline


Neck exam: PRESENT: full ROM


Respiratory exam: PRESENT: clear to auscultation aster


Cardiovascular exam: PRESENT: RRR


GI/Abdominal exam: PRESENT: soft, tenderness - Tenderness in the epigastric and 

right upper quadrant


Rectal exam: PRESENT: deferred


Extremities exam: PRESENT: full ROM, other


Musculoskeletal exam: PRESENT: full ROM


Neurological exam: PRESENT: alert, oriented to person, oriented to place, 

oriented to time, oriented to situation


Psychiatric exam: PRESENT: appropriate affect


Skin exam: PRESENT: normal color, warm





Results


Laboratory Results: 


 





 17 04:35 





 17 04:35 








Impressions: 


 





Abdomen Ultrasound  17 23:04


IMPRESSION:  Hepatic steatosis.  Hepatomegaly.


Gallstones.


 








Cholangiogram  17 00:00


IMPRESSION:  INTRAOPERATIVE CHOLANGIOGRAM.


 








Chest X-Ray  17 00:00


IMPRESSION:  Patchy left basilar airspace disease atelectasis versus pneumonia


 














Assessment & Plan





- Time


Time Spent: 30 to 50 Minutes





- Inpatient Certification


Medical Necessity: Need For IV Fluids, Need for Pain Control, Need for IV 

Antibiotics, Need for Surgery





- Plan Summary


Plan Summary: 





For laparoscopic cholecystectomy with intraoperative cholangiogram when the 

pancreatic enzymes and liver functions trend down

## 2018-04-05 ENCOUNTER — HOSPITAL ENCOUNTER (EMERGENCY)
Dept: HOSPITAL 62 - ER | Age: 33
Discharge: HOME | End: 2018-04-05
Payer: MEDICAID

## 2018-04-05 VITALS — SYSTOLIC BLOOD PRESSURE: 141 MMHG | DIASTOLIC BLOOD PRESSURE: 89 MMHG

## 2018-04-05 DIAGNOSIS — R19.7: ICD-10-CM

## 2018-04-05 DIAGNOSIS — I10: ICD-10-CM

## 2018-04-05 DIAGNOSIS — R05: ICD-10-CM

## 2018-04-05 DIAGNOSIS — R09.89: ICD-10-CM

## 2018-04-05 DIAGNOSIS — R00.0: ICD-10-CM

## 2018-04-05 DIAGNOSIS — R11.2: Primary | ICD-10-CM

## 2018-04-05 DIAGNOSIS — R50.9: ICD-10-CM

## 2018-04-05 DIAGNOSIS — J02.9: ICD-10-CM

## 2018-04-05 DIAGNOSIS — T37.5X5A: ICD-10-CM

## 2018-04-05 PROCEDURE — S0119 ONDANSETRON 4 MG: HCPCS

## 2018-04-05 PROCEDURE — 99283 EMERGENCY DEPT VISIT LOW MDM: CPT

## 2018-04-05 NOTE — ER DOCUMENT REPORT
ED General





- General


Chief Complaint: Nausea/Vomiting/Diarrhea


Stated Complaint: VOMITING


Time Seen by Provider: 18 12:22


Notes: 





33-year-old female here with complaints of fevers chills cough congestion runny 

nose sore throat ongoing for the past few days.  She went to the urgent care 

facility and was prescribed Tamiflu but does not know if she tested flu 

positive.  She then started taking the Tamiflu 2 days ago and immediately 

thereafter started to have several episodes of vomiting and diarrhea with 

minimal abdominal cramping.  She does not currently have any abdominal 

cramping.  Sick contacts include those in her family.


TRAVEL OUTSIDE OF THE [place] IN LAST 30 DAYS: No





- Related Data


Allergies/Adverse Reactions: 


 





No Known Allergies Allergy (Verified 18 12:10)


 











Past Medical History





- Social History


Smoking Status: Unknown if Ever Smoked


Family History: CAD, DM, Hypertension


Patient has suicidal ideation: No


Patient has homicidal ideation: No





- Past Medical History


Cardiac Medical History: Reports: Hx Hypertension - no meds


   Denies: Hx Coronary Artery Disease, Hx Heart Attack, Hx Heart Murmur


Pulmonary Medical History: 


   Denies: Hx Asthma, Hx Bronchitis, Hx COPD, Hx Pneumonia


Neurological Medical History: Denies: Hx Cerebrovascular Accident, Hx Seizures


Endocrine Medical History: Denies: Hx Hyperthyroidism, Hx Hypothyroidism


Renal/ Medical History: Reports: Hx Ovarian Cysts.  Denies: Hx Peritoneal 

Dialysis, Hx Pelvic Inflammatory Disease


Malignancy Medical History: Denies: Hx Breast Cancer, Hx Cervical Cancer, Hx 

Ovarian Cancer


Musculoskeltal Medical History: Denies Hx Arthritis


Past Surgical History: Reports: Hx  Section, Hx Cholecystectomy, Hx 

Dilation and Curettage.  Denies: Hx Hysterectomy





- Immunizations


Hx Diphtheria, Pertussis, Tetanus Vaccination: Yes





Review of Systems





- Review of Systems


Notes: 





See history of present illness for pertinent positive review of systems; 

otherwise all review of systems have been reviewed and are negative





Physical Exam





- Vital signs


Vitals: 





 











Temp Pulse Resp BP Pulse Ox


 


 100.1 F   114 H  18   141/89 H  97 


 


 18 12:13  18 12:13  18 12:13  18 12:13  18 12:13














- Notes


Notes: 





PHYSICAL EXAMINATION:





GENERAL: Well-appearing and in no acute distress.





HEAD: Atraumatic, normocephalic.





EYES: Pupils equal round and reactive to light, extraocular movements intact, 

sclera anicteric, conjunctiva are normal.





ENT: nares patent, oropharynx clear without exudates.  Moist mucous membranes.





NECK: Normal range of motion, supple without lymphadenopathy





LUNGS: CTAB and equal.  No wheezes rales or rhonchi.





HEART: Low 100s minimally tachycardic rate and regular rhythm without murmurs





ABDOMEN: Soft, no tenderness.  No guarding, no rebound.  No facial grimacing/

wincing upon palpation





EXTREMITIES: Normal range of motion, no pitting edema.  No cyanosis.





NEUROLOGICAL: Cranial nerves grossly intact. Normal sensory/motor exams.





PSYCH: Normal mood, normal affect.





SKIN: Warm, Dry, normal turgor, no rashes or lesions noted





Course





- Re-evaluation


Re-evalutation: 





18 12:31


MEDICAL DECISION MAKING:


Concern for medication side effect from the Tamiflu causing symptoms of nausea/

vomiting/diarrhea


Will instruct her to stop taking the Tamiflu and prescription Zofran/Phenergan 

with a dose here


Patient understands and agrees to the plan of care





- Vital Signs


Vital signs: 





 











Temp Pulse Resp BP Pulse Ox


 


 100.1 F   114 H  18   141/89 H  97 


 


 18 12:13  18 12:13  18 12:13  18 12:13  18 12:13














Discharge





- Discharge


Clinical Impression: 


 Medication side effect





Condition: Good


Disposition: HOME, SELF-CARE


Additional Instructions: 


Stop taking the Tamiflu.  It is likely the cause of your massive vomiting and 

diarrhea.  Use the prescribed Zofran and Phenergan as needed for your symptoms.

  Continue Tylenol/Motrin for your fevers aches and pains.  [facility] If you were given any 

sedating medications, be sure not to operate heavy machinery (example - driving

) and be sure you are not too sedated to walk appropriately.  Please followup 

with your primary physician in the next few days for further management/

evaluation.  Please return to the emergency department for worsening of 

symptoms or any symptom that you deem to be concerning or life-threatening.  

Thank you for allowing us to be part of your care.


Prescriptions: 


Ondansetron [Zofran Odt 4 mg Tablet] 1 tab PO Q4H PRN #15 tab.rapdis


 PRN Reason: For Nausea/Vomiting


Promethazine HCl [Phenergan 25 mg Tablet] 1 tab PO Q6H PRN #15 tablet


 PRN Reason:

## 2018-04-07 ENCOUNTER — HOSPITAL ENCOUNTER (EMERGENCY)
Dept: HOSPITAL 62 - ER | Age: 33
Discharge: HOME | End: 2018-04-07
Payer: MEDICAID

## 2018-04-07 VITALS — SYSTOLIC BLOOD PRESSURE: 142 MMHG | DIASTOLIC BLOOD PRESSURE: 80 MMHG

## 2018-04-07 DIAGNOSIS — E11.9: ICD-10-CM

## 2018-04-07 DIAGNOSIS — R44.3: ICD-10-CM

## 2018-04-07 DIAGNOSIS — R11.2: Primary | ICD-10-CM

## 2018-04-07 DIAGNOSIS — M54.5: ICD-10-CM

## 2018-04-07 DIAGNOSIS — I10: ICD-10-CM

## 2018-04-07 DIAGNOSIS — R50.9: ICD-10-CM

## 2018-04-07 DIAGNOSIS — R19.7: ICD-10-CM

## 2018-04-07 DIAGNOSIS — R10.9: ICD-10-CM

## 2018-04-07 LAB
ADD MANUAL DIFF: NO
ALBUMIN SERPL-MCNC: 4 G/DL (ref 3.5–5)
ALP SERPL-CCNC: 93 U/L (ref 38–126)
ALT SERPL-CCNC: 33 U/L (ref 9–52)
ANION GAP SERPL CALC-SCNC: 9 MMOL/L (ref 5–19)
APPEARANCE UR: (no result)
APTT PPP: YELLOW S
AST SERPL-CCNC: 22 U/L (ref 14–36)
BASOPHILS # BLD AUTO: 0.1 10^3/UL (ref 0–0.2)
BASOPHILS NFR BLD AUTO: 0.5 % (ref 0–2)
BILIRUB DIRECT SERPL-MCNC: 0.3 MG/DL (ref 0–0.4)
BILIRUB SERPL-MCNC: 0.3 MG/DL (ref 0.2–1.3)
BILIRUB UR QL STRIP: NEGATIVE
BUN SERPL-MCNC: 8 MG/DL (ref 7–20)
CALCIUM: 9.1 MG/DL (ref 8.4–10.2)
CHLORIDE SERPL-SCNC: 96 MMOL/L (ref 98–107)
CO2 SERPL-SCNC: 30 MMOL/L (ref 22–30)
EOSINOPHIL # BLD AUTO: 0 10^3/UL (ref 0–0.6)
EOSINOPHIL NFR BLD AUTO: 0 % (ref 0–6)
ERYTHROCYTE [DISTWIDTH] IN BLOOD BY AUTOMATED COUNT: 14.5 % (ref 11.5–14)
GLUCOSE SERPL-MCNC: 103 MG/DL (ref 75–110)
GLUCOSE UR STRIP-MCNC: NEGATIVE MG/DL
HCT VFR BLD CALC: 39.5 % (ref 36–47)
HGB BLD-MCNC: 12.8 G/DL (ref 12–15.5)
KETONES UR STRIP-MCNC: NEGATIVE MG/DL
LIPASE SERPL-CCNC: 127.3 U/L (ref 23–300)
LYMPHOCYTES # BLD AUTO: 2.2 10^3/UL (ref 0.5–4.7)
LYMPHOCYTES NFR BLD AUTO: 17.8 % (ref 13–45)
MCH RBC QN AUTO: 25.4 PG (ref 27–33.4)
MCHC RBC AUTO-ENTMCNC: 32.3 G/DL (ref 32–36)
MCV RBC AUTO: 79 FL (ref 80–97)
MONOCYTES # BLD AUTO: 1.1 10^3/UL (ref 0.1–1.4)
MONOCYTES NFR BLD AUTO: 9.2 % (ref 3–13)
NEUTROPHILS # BLD AUTO: 8.8 10^3/UL (ref 1.7–8.2)
NEUTS SEG NFR BLD AUTO: 72.5 % (ref 42–78)
NITRITE UR QL STRIP: NEGATIVE
PH UR STRIP: 9 [PH] (ref 5–9)
PLATELET # BLD: 271 10^3/UL (ref 150–450)
POTASSIUM SERPL-SCNC: 4.9 MMOL/L (ref 3.6–5)
PROT SERPL-MCNC: 8.4 G/DL (ref 6.3–8.2)
PROT UR STRIP-MCNC: >=500 MG/DL
RBC # BLD AUTO: 5.02 10^6/UL (ref 3.72–5.28)
SODIUM SERPL-SCNC: 135.4 MMOL/L (ref 137–145)
SP GR UR STRIP: 1.02
TOTAL CELLS COUNTED % (AUTO): 100 %
UROBILINOGEN UR-MCNC: 2 MG/DL (ref ?–2)
WBC # BLD AUTO: 12.2 10^3/UL (ref 4–10.5)

## 2018-04-07 PROCEDURE — 99284 EMERGENCY DEPT VISIT MOD MDM: CPT

## 2018-04-07 PROCEDURE — 85025 COMPLETE CBC W/AUTO DIFF WBC: CPT

## 2018-04-07 PROCEDURE — 74177 CT ABD & PELVIS W/CONTRAST: CPT

## 2018-04-07 PROCEDURE — 36415 COLL VENOUS BLD VENIPUNCTURE: CPT

## 2018-04-07 PROCEDURE — 83690 ASSAY OF LIPASE: CPT

## 2018-04-07 PROCEDURE — 96361 HYDRATE IV INFUSION ADD-ON: CPT

## 2018-04-07 PROCEDURE — 84703 CHORIONIC GONADOTROPIN ASSAY: CPT

## 2018-04-07 PROCEDURE — 80053 COMPREHEN METABOLIC PANEL: CPT

## 2018-04-07 PROCEDURE — 81001 URINALYSIS AUTO W/SCOPE: CPT

## 2018-04-07 PROCEDURE — 96374 THER/PROPH/DIAG INJ IV PUSH: CPT

## 2018-04-07 PROCEDURE — 96375 TX/PRO/DX INJ NEW DRUG ADDON: CPT

## 2018-04-07 NOTE — ER DOCUMENT REPORT
ED Medical Screen (RME)





- General


Chief Complaint: Nausea/Vomiting/Diarrhea


Stated Complaint: FEVER


Time Seen by Provider: 18 15:34


Notes: 





RME DISCLOSURE


I have seen this patient as part of a Rapid Medical Evaluation and, if 

applicable, placed any initially appropriate orders. The patient will be seen 

and fully evaluated, including a full history and physical exam, by a provider (

in Main ED or Fast Track) when a room becomes available.





------------------------------------------------------------------





33-year-old female back again to the ED for nausea vomiting diarrhea fevers 

that initially had developed after she started taking Tamiflu.  She was seen in 

the ED and told to discontinue the Tamiflu as it was thought to be a side 

effect from the Tamiflu.  Her last dose of the Tamiflu was 2-3 days ago.  She 

is back complaining that the prescribed Phenergan was helping in the short-term 

but that she woke up in the middle of the night and started vomiting again.  

Also complains of some abdominal cramping in the upper and upper right 

quadrants.  Status post cholecystectomy several months ago.





EXAM


Tachycardic


Epigastric and right upper quadrant tenderness


TRAVEL OUTSIDE OF THE [place] IN LAST 30 DAYS: No





- Related Data


Allergies/Adverse Reactions: 


 





No Known Allergies Allergy (Verified 18 15:16)


 











Past Medical History





- Social History


Chew tobacco use (# tins/day): No


Frequency of alcohol use: None


Drug Abuse: None





- Past Medical History


Cardiac Medical History: Reports: Hx Hypertension - no meds


   Denies: Hx Coronary Artery Disease, Hx Heart Attack, Hx Heart Murmur


Pulmonary Medical History: 


   Denies: Hx Asthma, Hx Bronchitis, Hx COPD, Hx Pneumonia


Neurological Medical History: Denies: Hx Cerebrovascular Accident, Hx Seizures


Endocrine Medical History: Denies: Hx Hyperthyroidism, Hx Hypothyroidism


Renal/ Medical History: Reports: Hx Ovarian Cysts.  Denies: Hx Peritoneal 

Dialysis, Hx Pelvic Inflammatory Disease


Malignancy Medical History: Denies: Hx Breast Cancer, Hx Cervical Cancer, Hx 

Ovarian Cancer


Musculoskeltal Medical History: Denies Hx Arthritis


Past Surgical History: Reports: Hx  Section, Hx Cholecystectomy, Hx 

Dilation and Curettage.  Denies: Hx Hysterectomy





- Immunizations


Hx Diphtheria, Pertussis, Tetanus Vaccination: Yes


History of Influenza Vaccine for 10/2017 - 3/2018 Season: Yes


Influenza Administration Date for 10/2017 - 3/2018 Season: 10/22/17





Physical Exam





- Vital signs


Vitals: 





 











Temp Pulse Resp BP Pulse Ox


 


 103.0 F H  113 H  14   140/85 H  98 


 


 18 15:16  18 15:16  18 15:16  18 15:16  18 15:16














Course





- Vital Signs


Vital signs: 





 











Temp Pulse Resp BP Pulse Ox


 


 103.0 F H  113 H  14   140/85 H  98 


 


 18 15:16  18 15:16  18 15:16  18 15:16  18 15:16

## 2018-04-07 NOTE — RADIOLOGY REPORT (SQ)
EXAM DESCRIPTION:  CT ABD/PELVIS WITH IV ONLY



COMPLETED DATE/TIME:  4/7/2018 4:43 pm



REASON FOR STUDY:  R sided abd pain n/v/d; eval colitis diverticuliti



COMPARISON:  None.



TECHNIQUE:  CT scan of the abdomen and pelvis performed using helical scanning technique with dynamic
 intravenous contrast injection.  No oral contrast. Images reviewed with lung, soft tissue, and bone 
windows. Reconstructed coronal and sagittal MPR images reviewed. Delayed images for evaluation of the
 urinary system also acquired. All images stored on PACS.

All CT scanners at this facility use dose modulation, iterative reconstruction, and/or weight based d
osing when appropriate to reduce radiation dose to as low as reasonably achievable (ALARA).

[facility]: Dose Right  [facility]: CareDose    [facility]: Dose Right    [facility]: Teradose 4D    [facility]: Smart Technologies



CONTRAST TYPE AND DOSE:  contrast/concentration: Isovue 370.00 mg/ml; Total Contrast Delivered: 100.0
 ml; Total Saline Delivered: 72.0 ml



RENAL FUNCTION:  BUN 8 creatinine 0.82.



RADIATION DOSE:  CT Rad equipment meets quality standard of care and radiation dose reduction techniq
ues were employed. CTDIvol: 21.1 - 29.0 mGy. DLP: 2607 mGy-cm..



LIMITATIONS:  None.



FINDINGS:  LOWER CHEST: No significant findings. No nodules or infiltrates.

LIVER: Normal size. No masses.  No dilated ducts.

SPLEEN: Normal size. No focal lesions.

PANCREAS: No masses. No significant calcifications. No adjacent inflammation or peripancreatic fluid 
collections. Pancreatic duct not dilated.

GALLBLADDER: Surgically absent.

ADRENAL GLANDS: No significant masses or asymmetry.

RIGHT KIDNEY AND URETER: No solid masses.   No significant calcifications.   No hydronephrosis or hyd
roureter.

LEFT KIDNEY AND URETER: No solid masses.   No significant calcifications.   No hydronephrosis or hydr
oureter.

AORTA AND VESSELS: No aneurysm. No dissection. Renal arteries, SMA, celiac without stenosis.

RETROPERITONEUM: No retroperitoneal adenopathy, hemorrhage or masses.

BOWEL AND PERITONEAL CAVITY: No masses or inflammatory changes. No free fluid or peritoneal masses.

APPENDIX: Not visualized.

PELVIS: No mass.  No free fluid. Normal bladder.

ABDOMINAL WALL: No masses.  Diastases of the rectus abdominus muscles.

BONES: No significant or acute findings.

OTHER: No other significant finding.



IMPRESSION:  NO SIGNIFICANT OR ACUTE FINDING IN THE ABDOMEN OR PELVIS ON CT SCAN WITH IV CONTRAST.



TECHNICAL DOCUMENTATION:  JOB ID:  [id number]

Quality ID # 436: Final reports with documentation of one or more dose reduction techniques (e.g., Au
tomated exposure control, adjustment of the mA and/or kV according to patient size, use of iterative 
reconstruction technique)

 2011 [employer]- All Rights Reserved



Reading location - IP/workstation name: [email or web address]

## 2018-04-07 NOTE — ER DOCUMENT REPORT
ED General





- General


Mode of Arrival: Ambulatory


Information source: Patient


TRAVEL OUTSIDE OF THE [place] IN LAST 30 DAYS: No





<EM GILMAN - Last Filed: 18 17:38>





<ANTONI POWERS - Last Filed: 18 23:05>





- General


Chief Complaint: Nausea/Vomiting/Diarrhea


Stated Complaint: FEVER


Time Seen by Provider: 18 15:34


Notes: 


Patient is a 33 year old female with a history of diabetes presents to the 

emergency department complaining of flu like symptoms including fever, nausea, 

vomiting, abdominal pain and cramps and diarrhea. Patient was recently 

diagnosed with the flu and prescribed Tammi Flu as well as Phenagran. Patient 

states Tammi Flu increased her symptoms and she discontinued using it. She also 

states she has been unable to hold the Phenagran down. Patient states her 

symptoms have persisted and she has maintained a fever. Patient also complained 

of hallucinations and lower back pain. Patient denies any blood in vomit or 

stool. 


 (EM GILMAN)





- Related Data


Allergies/Adverse Reactions: 


 





No Known Allergies Allergy (Verified 18 15:16)


 











Past Medical History





- General


Information source: Patient





- Social History


Smoking Status: Never Smoker


Chew tobacco use (# tins/day): No


Frequency of alcohol use: None


Drug Abuse: None


Family History: CAD, DM, Hypertension


Patient has suicidal ideation: No


Patient has homicidal ideation: No





- Past Medical History


Cardiac Medical History: Reports: Hx Hypertension - no meds


Endocrine Medical History: Reports: Hx Diabetes Mellitus Type 2


Renal/ Medical History: Reports: Hx Ovarian Cysts


Past Surgical History: Reports: Hx  Section, Hx Cholecystectomy, Hx 

Dilation and Curettage





- Immunizations


Hx Diphtheria, Pertussis, Tetanus Vaccination: Yes





<EM GILMAN - Last Filed: 18 17:38>





Review of Systems





- Review of Systems


Constitutional: See HPI, Fever


EENT: No symptoms reported


Cardiovascular: No symptoms reported


Respiratory: No symptoms reported


Gastrointestinal: See HPI, Abdominal pain, Diarrhea, Nausea


Genitourinary: No symptoms reported


Female Genitourinary: No symptoms reported


Musculoskeletal: See HPI


Skin: No symptoms reported


Hematologic/Lymphatic: No symptoms reported


Neurological/Psychological: See HPI, Hallucinations


-: Yes All other systems reviewed and negative





<EM GILMAN - Last Filed: 18 17:38>





Physical Exam





<MUKULEM - Last Filed: 18 17:38>





<ANTONI POWERS - Last Filed: 18 23:05>





- Vital signs


Vitals: 


 











Temp Pulse Resp BP Pulse Ox


 


 103.0 F H  113 H  14   140/85 H  98 


 


 18 15:16  18 15:16  18 15:16  18 15:16  18 15:16














- Notes


Notes: 


GENERAL: Alert, interacts well. Appears fatigued.


HEAD: Normocephalic, atraumatic.


EYES: Pupils equal, round, and reactive to light. Extraocular movements intact.


ENT: Oral mucosa moist, tongue midline. 


NECK: Full range of motion. Supple. Trachea midline.


LUNGS: Clear to auscultation bilaterally, no wheezes, rales, or rhonchi. No 

respiratory distress.


HEART: Mild tachycardia. No murmurs, gallops, or rubs.


ABDOMEN: Soft, non-tender. Non-distended. Bowel sounds present in all 4 

quadrants.


EXTREMITIES: Moves all 4 extremities spontaneously. No edema, radial and 

dorsalis pedis pulses 2/4 bilaterally. No cyanosis.


NEUROLOGICAL: Alert and oriented x3. Normal speech.


PSYCH: Normal affect, normal mood.


SKIN: Warm, dry, normal turgor. No rashes or lesions noted.





 (EM GILMAN)





Course





- Laboratory


Result Diagrams: 


 18 15:49





 18 15:49





<EM GILMAN - Last Filed: 18 17:38>





- Laboratory


Result Diagrams: 


 18 15:49





 18 15:49





<ANTONI POWERS - Last Filed: 18 23:05>





- Re-evaluation


Re-evalutation: 





18 19:06


CBC shows leukocytosis of 12.2, CMP grossly unremarkable, no renal failure, 

potassium normal, lipase normal, pregnancy test negative, urinalysis shows 

greater than 500 of protein and large blood, patient admits to being on her 

menstrual cycle, suspect this is contamination rather than true blood, there 

are 6 squamous epithelial cells.  Patient does not have symptoms of a kidney 

stone.  Patient also had a CT scan of the abdomen pelvis performed which did 

not reveal any signs of obstructive uropathy or hydronephrosis, appendix was 

not visualized at this time.





Abdomen is benign, patient is eating and drinking without difficulty, patient 

will be discharged to home, given Reglan to take by mouth at home as this is 

what fixed her vomiting here and asked to return for worsening or persistent 

symptoms. (ANTONI POWERS)





- Vital Signs


Vital signs: 


 











Temp Pulse Resp BP Pulse Ox


 


 99.4 F   90   18   142/80 H  98 


 


 18 19:29  18 19:29  18 19:29  18 19:29  18 19:29














- Laboratory


Laboratory results interpreted by me: 


 











  18





  15:49 15:49 15:58


 


WBC  12.2 H  


 


MCV  79 L  


 


MCH  25.4 L  


 


RDW  14.5 H  


 


Absolute Neutrophils  8.8 H  


 


Sodium   135.4 L 


 


Chloride   96 L 


 


Total Protein   8.4 H 


 


Urine Protein    >=500 H


 


Urine Blood    LARGE H


 


Urine Urobilinogen    2.0 H


 


Urine Ascorbic Acid    40 H














Discharge





<EM GILMAN - Last Filed: 18 17:38>





<ANTONI POWERS - Last Filed: 18 23:05>





- Discharge


Clinical Impression: 


 Nausea vomiting and diarrhea





Condition: Stable


Disposition: HOME, SELF-CARE


Additional Instructions: 


Drink plenty of fluids, if the Reglan does not stop your vomiting please return 

to the emergency department.  Please return if your pain worsens or you develop 

any new or concerning symptoms.


Prescriptions: 


Metoclopramide HCl [Reglan 10 mg Tablet] 1 - 2 tab PO ASDIR PRN #25 tablet


 PRN Reason: 


Referrals: 


JORDAN LAND MD [Primary Care Provider] - Follow up as needed


Scribe Attestation: 





18 23:05


I personally performed the services described in the documentation, reviewed 

and edited the documentation which was dictated to the scribe in my presence, 

and it accurately records my words and actions. (ANTONI POWERS)





Scribe Documentation





- Scribe


Written by Scribe::  Elaine Jenkins, 2018 17:44


acting as scribe for :: Popeye





<EM GILMAN - Last Filed: 18 17:38>

## 2019-01-17 ENCOUNTER — HOSPITAL ENCOUNTER (EMERGENCY)
Dept: HOSPITAL 62 - ER | Age: 34
LOS: 1 days | Discharge: HOME | End: 2019-01-18
Payer: MEDICAID

## 2019-01-17 DIAGNOSIS — Z20.2: Primary | ICD-10-CM

## 2019-01-17 LAB
APPEARANCE UR: CLEAR
APTT PPP: YELLOW S
BILIRUB UR QL STRIP: NEGATIVE
GLUCOSE UR STRIP-MCNC: NEGATIVE MG/DL
KETONES UR STRIP-MCNC: NEGATIVE MG/DL
NITRITE UR QL STRIP: NEGATIVE
PH UR STRIP: 5 [PH] (ref 5–9)
PROT UR STRIP-MCNC: NEGATIVE MG/DL
SP GR UR STRIP: 1.03
UROBILINOGEN UR-MCNC: NEGATIVE MG/DL (ref ?–2)

## 2019-01-17 PROCEDURE — 81001 URINALYSIS AUTO W/SCOPE: CPT

## 2019-01-17 PROCEDURE — 99283 EMERGENCY DEPT VISIT LOW MDM: CPT

## 2019-01-17 PROCEDURE — 87591 N.GONORRHOEAE DNA AMP PROB: CPT

## 2019-01-17 PROCEDURE — 36415 COLL VENOUS BLD VENIPUNCTURE: CPT

## 2019-01-17 PROCEDURE — 87491 CHLMYD TRACH DNA AMP PROBE: CPT

## 2019-01-17 PROCEDURE — 81025 URINE PREGNANCY TEST: CPT

## 2019-01-17 PROCEDURE — 96372 THER/PROPH/DIAG INJ SC/IM: CPT

## 2019-01-17 NOTE — ER DOCUMENT REPORT
HPI





- HPI


Time Seen by Provider: 19 22:58


Pain Level: 1


Notes: 





Patient is a 33-year-old female with no significant past medical history who 

presents the emergency department requesting treatment for possible exposure to 

an STD.  Patient states that she was told by her partner that he was treated as 

a precautionary recently and told her to go get checked out.  Patient states 

that she is here for the medicine and is going to the health department tomorrow

otherwise.  Patient states that she has no other symptoms to report.  She is 

feeling well.  Denies drug allergies.  She is eating and drinking without 

difficulty.  She is urinating normally and having normal bowel movements.  She 

has not had any vaginal discharge, odor, or bleeding.  Denies any headache, 

fever, neck pain, URI, sore throat, chest pain, palpitations, syncope, cough, 

shortness of breath, wheeze, dyspnea, abdominal pain, nausea/vomiting/diarrhea, 

urinary retention, dysuria, hematuria, back pain, or rash.  Pt is currently on 

her menstrual cycle.





- ROS


Systems Reviewed and Negative: Yes All other systems reviewed and negative





- CONSTITUTIONAL


Constitutional: DENIES: Fever, Chills





- REPRODUCTIVE


Reproductive: DENIES: Pregnant:





Past Medical History





- Social History


Smoking Status: Never Smoker


Chew tobacco use (# tins/day): No


Frequency of alcohol use: None


Drug Abuse: None


Family History: CAD, DM, Hypertension


Patient has suicidal ideation: No


Patient has homicidal ideation: No





- Past Medical History


Cardiac Medical History: Reports: Hx Hypertension - no meds


   Denies: Hx Coronary Artery Disease, Hx Heart Attack, Hx Heart Murmur


Pulmonary Medical History: 


   Denies: Hx Asthma, Hx Bronchitis, Hx COPD, Hx Pneumonia


Neurological Medical History: Denies: Hx Cerebrovascular Accident, Hx Seizures


Endocrine Medical History: Reports: Hx Diabetes Mellitus Type 2.  Denies: Hx 

Hyperthyroidism, Hx Hypothyroidism


Renal/ Medical History: Reports: Hx Ovarian Cysts.  Denies: Hx Peritoneal 

Dialysis, Hx Pelvic Inflammatory Disease


Malignancy Medical History: Denies: Hx Breast Cancer, Hx Cervical Cancer, Hx 

Ovarian Cancer


Musculoskeletal Medical History: Denies Hx Arthritis


Past Surgical History: Reports: Hx  Section, Hx Cholecystectomy, Hx 

Dilation and Curettage.  Denies: Hx Hysterectomy





- Immunizations


Hx Diphtheria, Pertussis, Tetanus Vaccination: Yes





Vertical Provider Document





- CONSTITUTIONAL


Agree With Documented VS: Yes


Notes: 





PHYSICAL EXAMINATION:





GENERAL: Well-appearing, well-nourished and in no acute distress.





HEAD: Atraumatic, normocephalic.





EYES: Pupils equal round and reactive to light, extraocular movements intact, 

sclera anicteric, conjunctiva are normal.





ENT: Nares patent and without discharge.  oropharynx clear without exudates.  No

tonsilar hypertrophy or erythema.  Moist mucous membranes.  





NECK: Normal range of motion, supple without lymphadenopathy





LUNGS: Breath sounds clear to auscultation bilaterally and equal.  No wheezes 

rales or rhonchi.





HEART: Regular rate and rhythm without murmurs, rubs, gallops.





ABDOMEN: Soft, nontender, nondistended abdomen.  No guarding, no rebound.  No 

masses appreciated.  Normal bowel sounds present.  No CVA tenderness b

ilaterally.





: deferred





NEUROLOGICAL: Normal speech, normal gait.  





PSYCH: Normal mood, normal affect.





SKIN: Warm, Dry, normal turgor, no rashes or lesions noted.





- INFECTION CONTROL


TRAVEL OUTSIDE OF THE [place] IN LAST 30 DAYS: No





Course





- Re-evaluation


Re-evalutation: 





19 23:51


Patient is an afebrile, well-hydrated, 33-year-old female who presents emergency

department for possible exposure to STD.  Without significant tachycardia, 

tachypnea, or hypoxia.  PE is otherwise unremarkable.  Urinalysis was 

unremarkable.  Chlamydia and gonorrhea tests are pending.  Patient was given 

Rocephin and Zithromax.  Patient's abdomen is soft and nontender.  She is 

nontoxic-appearing and is tolerating p.o. without difficulty.  No labs or 

imaging warranted otherwise.  Low suspicion/risk for acute appendicitis, bowel 

obstruction, acute cholecystitis, acute cholangitis, perforated diverticulitis, 

incarcerated hernia, pancreatitis, perforated ulcer, peritonitis, sepsis, pelvic

inflammatory disease, ectopic pregnancy, tubo-ovarian abscess, ovarian torsion, 

or other systemic emergent condition at this time.  Patient is aware that her 

condition can change from initial presentation and she needs to monitor symptoms

closely and seek medical attention if any acute changes.  Conservative measures 

otherwise for symptoms.  Recheck with your PCM/health department in the next few

days or as able.  Return to the ED with any worsening/concerning symptoms 

otherwise as reviewed in discharge.  Patient is in agreement.








- Vital Signs


Vital signs: 


                                        











Temp Pulse Resp BP Pulse Ox


 


 98.2 F   79   18   126/82 H  98 


 


 19 22:29  19 22:29  19 22:29  19 22:29  19 22:29














- Laboratory


Laboratory results interpreted by me: 


                                        











  19





  22:55


 


Urine Blood  LARGE H


 


Urine Ascorbic Acid  20 H














Discharge





- Discharge


Clinical Impression: 


 Possible exposure to STD





Condition: Stable


Disposition: HOME, SELF-CARE


Additional Instructions: 


Maintain fluid intake


Proper hygienic technique


Keep the skin clean


Safe sexual practices with condoms everytime


Tylenol/ibuprofen as needed


Check in with the health department this week for further testing if warranted


Your chlamydia/Ghon test are pending and you will be notified if positive result

s; you may call in 1 day for the results as well


F/u with your PCM/OBGYN in 2-3 days for a recheck


Return to the ED with any development of HA/fever, trouble with vision, eye 

redness, worsening pain, urethral discharge, urinary retention, blood in the 

urine, flank pain, abdominal pain, n/v, Chest Pain, shortness of breath, joint 

pains, trouble breathing, or any other worsening/concerning symptoms as needed 

otherwise.


Forms:  Elevated Blood Pressure


Referrals: 


JORDAN LAND MD [Primary Care Provider] - Follow up as needed


[facility] [NO LOCAL MD] - Follow up as needed

## 2019-01-18 VITALS — DIASTOLIC BLOOD PRESSURE: 85 MMHG | SYSTOLIC BLOOD PRESSURE: 145 MMHG

## 2019-01-18 LAB
CHLAM PCR: NOT DETECTED
GON PCR: NOT DETECTED